# Patient Record
Sex: MALE | Race: WHITE | NOT HISPANIC OR LATINO | Employment: PART TIME | URBAN - METROPOLITAN AREA
[De-identification: names, ages, dates, MRNs, and addresses within clinical notes are randomized per-mention and may not be internally consistent; named-entity substitution may affect disease eponyms.]

---

## 2017-12-06 ENCOUNTER — HOSPITAL ENCOUNTER (OUTPATIENT)
Dept: RADIOLOGY | Facility: HOSPITAL | Age: 22
Discharge: HOME | End: 2017-12-06
Attending: ORTHOPAEDIC SURGERY
Payer: COMMERCIAL

## 2017-12-06 ENCOUNTER — OFFICE VISIT (OUTPATIENT)
Dept: SPORTS MEDICINE | Facility: CLINIC | Age: 22
End: 2017-12-06
Payer: COMMERCIAL

## 2017-12-06 VITALS
WEIGHT: 171 LBS | HEART RATE: 75 BPM | HEIGHT: 70 IN | DIASTOLIC BLOOD PRESSURE: 72 MMHG | SYSTOLIC BLOOD PRESSURE: 118 MMHG | BODY MASS INDEX: 24.48 KG/M2

## 2017-12-06 DIAGNOSIS — M25.562 CHRONIC PAIN OF LEFT KNEE: ICD-10-CM

## 2017-12-06 DIAGNOSIS — G89.29 CHRONIC PAIN OF RIGHT KNEE: ICD-10-CM

## 2017-12-06 DIAGNOSIS — M25.569 KNEE PAIN, UNSPECIFIED CHRONICITY, UNSPECIFIED LATERALITY: ICD-10-CM

## 2017-12-06 DIAGNOSIS — G89.29 CHRONIC PAIN OF LEFT KNEE: ICD-10-CM

## 2017-12-06 DIAGNOSIS — M25.569 KNEE PAIN, UNSPECIFIED CHRONICITY, UNSPECIFIED LATERALITY: Primary | ICD-10-CM

## 2017-12-06 DIAGNOSIS — M25.561 CHRONIC PAIN OF RIGHT KNEE: ICD-10-CM

## 2017-12-06 PROCEDURE — 99999 PR PBB SHADOW E&M-EST. PATIENT-LVL III: CPT | Mod: PBBFAC,,, | Performed by: ORTHOPAEDIC SURGERY

## 2017-12-06 PROCEDURE — 73564 X-RAY EXAM KNEE 4 OR MORE: CPT | Mod: TC,50,PO

## 2017-12-06 PROCEDURE — 73564 X-RAY EXAM KNEE 4 OR MORE: CPT | Mod: 26,50,, | Performed by: RADIOLOGY

## 2017-12-06 PROCEDURE — 99214 OFFICE O/P EST MOD 30 MIN: CPT | Mod: S$GLB,,, | Performed by: ORTHOPAEDIC SURGERY

## 2017-12-06 NOTE — PROGRESS NOTES
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 Male with a history of Bilateral pain who He states that the pain is severe and not responding to any conservative care.      Pain has been present for 3-4 years  Right knee is worse than the left  No recent injury but he notes his pain began doing PT testing in the past that involved running on an uneven surface    + mechanical symptoms (clicking), no instability  He notes that his knee has locked up in the past and given out on him in the past     Is affecting ADLs.      Patient would like to have Dr. Panchal sign paperwork to get a profile to allow him to walk instead of run due to his pain for PT testing for [employer]     Review of Systems   Constitution: Negative. Negative for chills, fever and night sweats.   HENT: Negative for congestion and headaches.    Eyes: Negative for blurred vision, left vision loss and right vision loss.   Cardiovascular: Negative for chest pain and syncope.   Respiratory: Negative for cough and shortness of breath.    Endocrine: Negative for polydipsia, polyphagia and polyuria.   Hematologic/Lymphatic: Negative for bleeding problem. Does not bruise/bleed easily.   Skin: Negative for dry skin, itching and rash.   Musculoskeletal: Negative for falls. Positive for knee pain and muscle weakness.   Gastrointestinal: Negative for abdominal pain and bowel incontinence.   Genitourinary: Negative for bladder incontinence and nocturia.   Neurological: Negative for disturbances in coordination, loss of balance and seizures.   Psychiatric/Behavioral: Negative for depression. The patient does not have insomnia.    Allergic/Immunologic: Negative for hives and persistent infections.     PAST MEDICAL HISTORY:   Past Medical History:   Diagnosis Date    Anxiety     Depression     History of psychiatric care     Psychiatric problem     Therapy      PAST SURGICAL HISTORY:   Past  "Surgical History:   Procedure Laterality Date    APPENDECTOMY       FAMILY HISTORY:   Family History   Problem Relation Age of Onset    No Known Problems Mother     No Known Problems Father     No Known Problems Sister     No Known Problems Brother      SOCIAL HISTORY:   Social History     Social History    Marital status: Single     Spouse name: N/A    Number of children: N/A    Years of education: N/A     Occupational History    Not on file.     Social History Main Topics    Smoking status: Never Smoker    Smokeless tobacco: Never Used    Alcohol use Yes      Comment: social     Drug use: No    Sexual activity: No     Other Topics Concern    Caffeine Use Yes    Legal: Involved In Criminal Litigation No    Financial Status: Employed Yes    Leisure: Movie Watching Yes    Childhood History: Raised By Parents Yes    Childhood History: Other Yes    Leisure: Time With Family Yes    Education: High School Graduate Yes    Home Situation: Lives With Family Yes    Patient Has Had A Drink/Used Drugs As An Eye Opener In The Am No     Social History Narrative    No narrative on file       MEDICATIONS:   Current Outpatient Prescriptions:     ondansetron (ZOFRAN) 4 MG tablet, Take 1 tablet (4 mg total) by mouth every 6 (six) hours as needed for Nausea., Disp: 12 tablet, Rfl: 0  ALLERGIES: Review of patient's allergies indicates:  No Known Allergies    VITAL SIGNS: /72   Pulse 75   Ht 5' 10" (1.778 m)   Wt 77.6 kg (171 lb)   BMI 24.54 kg/m²      PHYSICAL EXAMINATION  VITAL SIGNS: /72   Pulse 75   Ht 5' 10" (1.778 m)   Wt 77.6 kg (171 lb)   BMI 24.54 kg/m²    General:  The patient is alert and oriented x 3.  Mood is pleasant.  Observation of ears, eyes and nose reveal no gross abnormalities.  HEENT: NCAT, sclera nonicteric  Lungs: Respirations are equal and unlabored.    Bilateral KNEE EXAMINATION     OBSERVATION / INSPECTION   Gait:   Nonantalgic   Alignment:  Neutral   Scars:   None "   Muscle atrophy: Mild  Effusion:  None   Warmth:  None   Discoloration:   none     TENDERNESS / CREPITUS (T / C):          T / C      T / C   Patella   - / -   Lateral joint line   + right / -    Peripatellar medial  -  Medial joint line    + left / -    Peripatellar lateral -  Medial plica   - / -    Patellar tendon + bilat   Popliteal fossa   - / -    Quad tendon   + bilat   Gastrocnemius   -   Prepatellar Bursa - / -   Quadricep   -   Tibial tubercle  -  Thigh/hamstring  -   Pes anserine/HS + bilat  Fibula    -   ITB   + bilat/ -  Tibia     -   Tib/fib joint  - / -  LCL    -     MFC   - / -   MCL: Proximal  -    LFC   - / -    Distal   -          ROM: (* = pain)  PASSIVE   ACTIVE    Left :   5 / 0 / 135   5 / 0 / 135     Right :    5 / 0 / 135   5 / 0 / 135    Patellofemoral examination:  See above noted areas of tenderness.   Patella position    Subluxation / dislocation: Centered           Sup. / Inf;   Normal   Crepitus (PF):    Absent   Patellar Mobility:       Medial-lateral:   Normal    Superior-inferior:  Normal    Inferior tilt   Normal    Patellar tendon:  Normal   Lateral tilt:    Normal   J-sign:     None   Patellofemoral grind:   No pain       MENISCAL SIGNS:     Pain on terminal extension:  -  Pain on terminal flexion:  + right  Robins maneuver:  + for pain, bilateral  Squat     + anterior knee pain    LIGAMENT EXAMINATION:  ACL / Lachman:  normal (-1 to 2mm)    PCL-Post.  drawer: normal 0 to 2mm  MCL- Valgus:  normal 0 to 2mm  LCL- Varus:  normal 0 to 2mm  Pivot shift: normal (Equal)   Dial Test: difference c/w other side   At 30° flexion: normal (< 5°)    At 90° flexion: normal (< 5°)   Reverse Pivot Shift:   normal (Equal)     STRENGTH: (* = with pain) PAINFUL SIDE   Quadricep   5/5   Hamstrin/5    EXTREMITY NEURO-VASCULAR EXAMINATION:   Sensation:  Grossly intact to light touch all dermatomal regions.   Motor Function:  Fully intact motor function at hip, knee, foot and ankle     DTRs;  quadriceps and  achilles 2+.  No clonus and downgoing Babinski.    Vascular status:  DP and PT pulses 2+, brisk capillary refill, symmetric.     Other Findings:  ++ Bridge  + step down     X-rays:  including standing, weight bearing AP and flexion bilateral knees, lateral and merchant views ordered and images reviewed by me show:  No fracture, dislocation     ASSESSMENT:    Bilateral Knee anterior knee pain due to overload because of hip/core weakness  It band tightness    PLAN:   PT  All questions were answered, pt will contact us for questions or concerns in the interim.

## 2018-02-07 ENCOUNTER — TELEPHONE (OUTPATIENT)
Dept: SPORTS MEDICINE | Facility: CLINIC | Age: 23
End: 2018-02-07

## 2018-02-07 DIAGNOSIS — M25.561 PAIN IN BOTH KNEES, UNSPECIFIED CHRONICITY: Primary | ICD-10-CM

## 2018-02-07 DIAGNOSIS — M25.562 PAIN IN BOTH KNEES, UNSPECIFIED CHRONICITY: Primary | ICD-10-CM

## 2019-11-20 ENCOUNTER — HOSPITAL ENCOUNTER (INPATIENT)
Facility: HOSPITAL | Age: 24
LOS: 3 days | Discharge: HOME HEALTH | DRG: 517 | End: 2019-11-23
Attending: EMERGENCY MEDICINE | Admitting: ORTHOPAEDIC SURGERY
Payer: COMMERCIAL

## 2019-11-20 ENCOUNTER — ANESTHESIA EVENT (OUTPATIENT)
Dept: SURGERY | Facility: HOSPITAL | Age: 24
DRG: 517 | End: 2019-11-20
Payer: COMMERCIAL

## 2019-11-20 DIAGNOSIS — M25.551 RIGHT HIP PAIN: ICD-10-CM
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
## 2019-11-20 DIAGNOSIS — S32.421A: ICD-10-CM

## 2019-11-20 DIAGNOSIS — S32.421A CLOSED DISPLACED FRACTURE OF POSTERIOR WALL OF RIGHT ACETABULUM, INITIAL ENCOUNTER: ICD-10-CM

## 2019-11-20 DIAGNOSIS — S73.014A CLOSED POSTERIOR DISLOCATION OF RIGHT HIP, INITIAL ENCOUNTER: Primary | ICD-10-CM

## 2019-11-20 DIAGNOSIS — S32.461A: ICD-10-CM

## 2019-11-20 LAB
ABO + RH BLD: NORMAL
ALBUMIN SERPL BCP-MCNC: 4.4 G/DL (ref 3.5–5.2)
ALP SERPL-CCNC: 56 U/L (ref 55–135)
ALT SERPL W/O P-5'-P-CCNC: 70 U/L (ref 10–44)
ANION GAP SERPL CALC-SCNC: 15 MMOL/L (ref 8–16)
AST SERPL-CCNC: 88 U/L (ref 10–40)
BASOPHILS # BLD AUTO: 0.08 K/UL (ref 0–0.2)
BASOPHILS NFR BLD: 1.4 % (ref 0–1.9)
BILIRUB SERPL-MCNC: 0.4 MG/DL (ref 0.1–1)
BLD GP AB SCN CELLS X3 SERPL QL: NORMAL
BUN SERPL-MCNC: 16 MG/DL (ref 6–20)
CALCIUM SERPL-MCNC: 9.1 MG/DL (ref 8.7–10.5)
CHLORIDE SERPL-SCNC: 104 MMOL/L (ref 95–110)
CO2 SERPL-SCNC: 24 MMOL/L (ref 23–29)
CREAT SERPL-MCNC: 1.1 MG/DL (ref 0.5–1.4)
DIFFERENTIAL METHOD: ABNORMAL
EOSINOPHIL # BLD AUTO: 0.1 K/UL (ref 0–0.5)
EOSINOPHIL NFR BLD: 2.1 % (ref 0–8)
ERYTHROCYTE [DISTWIDTH] IN BLOOD BY AUTOMATED COUNT: 12.4 % (ref 11.5–14.5)
EST. GFR  (AFRICAN AMERICAN): >60 ML/MIN/1.73 M^2
EST. GFR  (NON AFRICAN AMERICAN): >60 ML/MIN/1.73 M^2
ETHANOL SERPL-MCNC: 108 MG/DL
GLUCOSE SERPL-MCNC: 98 MG/DL (ref 70–110)
HCT VFR BLD AUTO: 45.3 % (ref 40–54)
HGB BLD-MCNC: 14.6 G/DL (ref 14–18)
IMM GRANULOCYTES # BLD AUTO: 0.04 K/UL (ref 0–0.04)
IMM GRANULOCYTES NFR BLD AUTO: 0.7 % (ref 0–0.5)
INR PPP: 1 (ref 0.8–1.2)
LYMPHOCYTES # BLD AUTO: 1.9 K/UL (ref 1–4.8)
LYMPHOCYTES NFR BLD: 32 % (ref 18–48)
MCH RBC QN AUTO: 28.3 PG (ref 27–31)
MCHC RBC AUTO-ENTMCNC: 32.2 G/DL (ref 32–36)
MCV RBC AUTO: 88 FL (ref 82–98)
MONOCYTES # BLD AUTO: 0.4 K/UL (ref 0.3–1)
MONOCYTES NFR BLD: 6.5 % (ref 4–15)
NEUTROPHILS # BLD AUTO: 3.3 K/UL (ref 1.8–7.7)
NEUTROPHILS NFR BLD: 57.3 % (ref 38–73)
NRBC BLD-RTO: 0 /100 WBC
PLATELET # BLD AUTO: 269 K/UL (ref 150–350)
PMV BLD AUTO: 11.1 FL (ref 9.2–12.9)
POTASSIUM SERPL-SCNC: 3.9 MMOL/L (ref 3.5–5.1)
PROT SERPL-MCNC: 8 G/DL (ref 6–8.4)
PROTHROMBIN TIME: 10.7 SEC (ref 9–12.5)
RBC # BLD AUTO: 5.16 M/UL (ref 4.6–6.2)
SODIUM SERPL-SCNC: 143 MMOL/L (ref 136–145)
WBC # BLD AUTO: 5.82 K/UL (ref 3.9–12.7)

## 2019-11-20 PROCEDURE — 11000001 HC ACUTE MED/SURG PRIVATE ROOM

## 2019-11-20 PROCEDURE — 86920 COMPATIBILITY TEST SPIN: CPT

## 2019-11-20 PROCEDURE — 25500020 PHARM REV CODE 255: Performed by: EMERGENCY MEDICINE

## 2019-11-20 PROCEDURE — 99285 PR EMERGENCY DEPT VISIT,LEVEL V: ICD-10-PCS | Mod: 25,,, | Performed by: PHYSICIAN ASSISTANT

## 2019-11-20 PROCEDURE — 63600175 PHARM REV CODE 636 W HCPCS: Performed by: STUDENT IN AN ORGANIZED HEALTH CARE EDUCATION/TRAINING PROGRAM

## 2019-11-20 PROCEDURE — 86901 BLOOD TYPING SEROLOGIC RH(D): CPT

## 2019-11-20 PROCEDURE — 63600175 PHARM REV CODE 636 W HCPCS

## 2019-11-20 PROCEDURE — 96375 TX/PRO/DX INJ NEW DRUG ADDON: CPT

## 2019-11-20 PROCEDURE — 80320 DRUG SCREEN QUANTALCOHOLS: CPT

## 2019-11-20 PROCEDURE — 80053 COMPREHEN METABOLIC PANEL: CPT

## 2019-11-20 PROCEDURE — 25000003 PHARM REV CODE 250: Performed by: STUDENT IN AN ORGANIZED HEALTH CARE EDUCATION/TRAINING PROGRAM

## 2019-11-20 PROCEDURE — 63600175 PHARM REV CODE 636 W HCPCS: Performed by: PHYSICIAN ASSISTANT

## 2019-11-20 PROCEDURE — 96374 THER/PROPH/DIAG INJ IV PUSH: CPT

## 2019-11-20 PROCEDURE — 99152 PR MOD CONSCIOUS SEDATION, SAME PHYS, 5+ YRS, FIRST 15 MIN: ICD-10-PCS | Mod: ,,, | Performed by: PHYSICIAN ASSISTANT

## 2019-11-20 PROCEDURE — 99152 MOD SED SAME PHYS/QHP 5/>YRS: CPT | Mod: ,,, | Performed by: PHYSICIAN ASSISTANT

## 2019-11-20 PROCEDURE — 99285 EMERGENCY DEPT VISIT HI MDM: CPT | Mod: 25

## 2019-11-20 PROCEDURE — 99285 EMERGENCY DEPT VISIT HI MDM: CPT | Mod: 25,,, | Performed by: PHYSICIAN ASSISTANT

## 2019-11-20 PROCEDURE — 85610 PROTHROMBIN TIME: CPT

## 2019-11-20 PROCEDURE — 63600175 PHARM REV CODE 636 W HCPCS: Performed by: EMERGENCY MEDICINE

## 2019-11-20 PROCEDURE — 51702 INSERT TEMP BLADDER CATH: CPT

## 2019-11-20 PROCEDURE — 85025 COMPLETE CBC W/AUTO DIFF WBC: CPT

## 2019-11-20 RX ORDER — PHENYLEPHRINE HCL IN 0.9% NACL 1 MG/10 ML
SYRINGE (ML) INTRAVENOUS
Status: DISCONTINUED
Start: 2019-11-20 | End: 2019-11-20 | Stop reason: WASHOUT

## 2019-11-20 RX ORDER — LIDOCAINE HYDROCHLORIDE 10 MG/ML
INJECTION INFILTRATION; PERINEURAL
Status: DISPENSED
Start: 2019-11-20 | End: 2019-11-20

## 2019-11-20 RX ORDER — FENTANYL CITRATE 50 UG/ML
75 INJECTION, SOLUTION INTRAMUSCULAR; INTRAVENOUS
Status: DISCONTINUED | OUTPATIENT
Start: 2019-11-20 | End: 2019-11-20

## 2019-11-20 RX ORDER — FENTANYL CITRATE 50 UG/ML
100 INJECTION, SOLUTION INTRAMUSCULAR; INTRAVENOUS
Status: COMPLETED | OUTPATIENT
Start: 2019-11-20 | End: 2019-11-20

## 2019-11-20 RX ORDER — MORPHINE SULFATE 2 MG/ML
6 INJECTION, SOLUTION INTRAMUSCULAR; INTRAVENOUS
Status: COMPLETED | OUTPATIENT
Start: 2019-11-20 | End: 2019-11-20

## 2019-11-20 RX ORDER — LIDOCAINE HYDROCHLORIDE 10 MG/ML
20 INJECTION INFILTRATION; PERINEURAL ONCE
Status: COMPLETED | OUTPATIENT
Start: 2019-11-20 | End: 2019-11-20

## 2019-11-20 RX ORDER — ONDANSETRON 8 MG/1
8 TABLET, ORALLY DISINTEGRATING ORAL EVERY 8 HOURS PRN
Status: DISCONTINUED | OUTPATIENT
Start: 2019-11-20 | End: 2019-11-23 | Stop reason: HOSPADM

## 2019-11-20 RX ORDER — PROPOFOL 10 MG/ML
INJECTION, EMULSION INTRAVENOUS
Status: DISCONTINUED
Start: 2019-11-20 | End: 2019-11-20 | Stop reason: WASHOUT

## 2019-11-20 RX ORDER — KETOROLAC TROMETHAMINE 30 MG/ML
10 INJECTION, SOLUTION INTRAMUSCULAR; INTRAVENOUS
Status: COMPLETED | OUTPATIENT
Start: 2019-11-20 | End: 2019-11-20

## 2019-11-20 RX ORDER — LIDOCAINE HYDROCHLORIDE 10 MG/ML
1 INJECTION, SOLUTION EPIDURAL; INFILTRATION; INTRACAUDAL; PERINEURAL ONCE
Status: DISCONTINUED | OUTPATIENT
Start: 2019-11-20 | End: 2019-11-23 | Stop reason: HOSPADM

## 2019-11-20 RX ORDER — METHOCARBAMOL 750 MG/1
750 TABLET, FILM COATED ORAL 3 TIMES DAILY
Status: DISCONTINUED | OUTPATIENT
Start: 2019-11-20 | End: 2019-11-23 | Stop reason: HOSPADM

## 2019-11-20 RX ORDER — SODIUM CHLORIDE 0.9 % (FLUSH) 0.9 %
10 SYRINGE (ML) INJECTION
Status: DISCONTINUED | OUTPATIENT
Start: 2019-11-20 | End: 2019-11-23 | Stop reason: HOSPADM

## 2019-11-20 RX ORDER — ACETAMINOPHEN 325 MG/1
650 TABLET ORAL
Status: DISCONTINUED | OUTPATIENT
Start: 2019-11-20 | End: 2019-11-23 | Stop reason: HOSPADM

## 2019-11-20 RX ORDER — OXYCODONE HYDROCHLORIDE 5 MG/1
5 TABLET ORAL EVERY 4 HOURS PRN
Status: DISCONTINUED | OUTPATIENT
Start: 2019-11-20 | End: 2019-11-23 | Stop reason: HOSPADM

## 2019-11-20 RX ORDER — TALC
6 POWDER (GRAM) TOPICAL NIGHTLY PRN
Status: DISCONTINUED | OUTPATIENT
Start: 2019-11-20 | End: 2019-11-23 | Stop reason: HOSPADM

## 2019-11-20 RX ORDER — SUCCINYLCHOLINE CHLORIDE 20 MG/ML
INJECTION INTRAMUSCULAR; INTRAVENOUS
Status: DISCONTINUED
Start: 2019-11-20 | End: 2019-11-20 | Stop reason: WASHOUT

## 2019-11-20 RX ORDER — ROCURONIUM BROMIDE 10 MG/ML
INJECTION, SOLUTION INTRAVENOUS
Status: DISCONTINUED
Start: 2019-11-20 | End: 2019-11-20 | Stop reason: WASHOUT

## 2019-11-20 RX ORDER — PROPOFOL 10 MG/ML
1 VIAL (ML) INTRAVENOUS
Status: COMPLETED | OUTPATIENT
Start: 2019-11-20 | End: 2019-11-20

## 2019-11-20 RX ORDER — OXYCODONE HYDROCHLORIDE 10 MG/1
10 TABLET ORAL EVERY 4 HOURS PRN
Status: DISCONTINUED | OUTPATIENT
Start: 2019-11-20 | End: 2019-11-23 | Stop reason: HOSPADM

## 2019-11-20 RX ORDER — PROPOFOL 10 MG/ML
INJECTION, EMULSION INTRAVENOUS
Status: COMPLETED
Start: 2019-11-20 | End: 2019-11-20

## 2019-11-20 RX ORDER — ETOMIDATE 2 MG/ML
INJECTION INTRAVENOUS
Status: DISCONTINUED
Start: 2019-11-20 | End: 2019-11-20 | Stop reason: WASHOUT

## 2019-11-20 RX ORDER — FENTANYL CITRATE 50 UG/ML
INJECTION, SOLUTION INTRAMUSCULAR; INTRAVENOUS
Status: DISPENSED
Start: 2019-11-20 | End: 2019-11-20

## 2019-11-20 RX ORDER — HYDROMORPHONE HYDROCHLORIDE 1 MG/ML
0.5 INJECTION, SOLUTION INTRAMUSCULAR; INTRAVENOUS; SUBCUTANEOUS
Status: DISCONTINUED | OUTPATIENT
Start: 2019-11-20 | End: 2019-11-23 | Stop reason: HOSPADM

## 2019-11-20 RX ADMIN — IOHEXOL 75 ML: 350 INJECTION, SOLUTION INTRAVENOUS at 07:11

## 2019-11-20 RX ADMIN — ACETAMINOPHEN 650 MG: 325 TABLET ORAL at 11:11

## 2019-11-20 RX ADMIN — MORPHINE SULFATE 6 MG: 2 INJECTION, SOLUTION INTRAMUSCULAR; INTRAVENOUS at 03:11

## 2019-11-20 RX ADMIN — PROPOFOL 197 MG: 10 INJECTION, EMULSION INTRAVENOUS at 06:11

## 2019-11-20 RX ADMIN — Medication 197 MG: at 06:11

## 2019-11-20 RX ADMIN — METHOCARBAMOL TABLETS 750 MG: 750 TABLET, COATED ORAL at 03:11

## 2019-11-20 RX ADMIN — LIDOCAINE HYDROCHLORIDE 20 ML: 10 INJECTION, SOLUTION INFILTRATION; PERINEURAL at 05:11

## 2019-11-20 RX ADMIN — KETOROLAC TROMETHAMINE 10 MG: 30 INJECTION, SOLUTION INTRAMUSCULAR; INTRAVENOUS at 03:11

## 2019-11-20 RX ADMIN — FENTANYL CITRATE 100 MCG: 50 INJECTION INTRAMUSCULAR; INTRAVENOUS at 06:11

## 2019-11-20 RX ADMIN — ACETAMINOPHEN 650 MG: 325 TABLET ORAL at 08:11

## 2019-11-20 RX ADMIN — HYDROMORPHONE HYDROCHLORIDE 0.5 MG: 1 INJECTION, SOLUTION INTRAMUSCULAR; INTRAVENOUS; SUBCUTANEOUS at 11:11

## 2019-11-20 RX ADMIN — METHOCARBAMOL TABLETS 750 MG: 750 TABLET, COATED ORAL at 08:11
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
## 2019-11-20 NOTE — NURSING
Report received from ED nurse. Informed pt refused arriaga. Order still in place for mandatory arriaga for surgery. Informed to get order DC'd or to place arriaga before coming to floor.

## 2019-11-20 NOTE — ED NOTES
Personal belongings given to father.   Ladarius (father): [phone], please call for room assignment and update

## 2019-11-20 NOTE — SUBJECTIVE & OBJECTIVE
Past Medical History:   Diagnosis Date    Anxiety     Depression     History of psychiatric care     Psychiatric problem     Therapy        Past Surgical History:   Procedure Laterality Date    APPENDECTOMY         Review of patient's allergies indicates:  No Known Allergies    Current Facility-Administered Medications   Medication    acetaminophen tablet 650 mg    HYDROmorphone injection 0.5 mg    lidocaine (PF) 10 mg/ml (1%) injection 10 mg    melatonin tablet 6 mg    methocarbamol tablet 750 mg    ondansetron disintegrating tablet 8 mg    oxyCODONE immediate release tablet 10 mg    oxyCODONE immediate release tablet 15 mg    oxyCODONE immediate release tablet 5 mg    sodium chloride 0.9% flush 10 mL     Current Outpatient Medications   Medication Sig    acyclovir (ZOVIRAX) 400 MG tablet Take 1 tablet by mouth three times daily for 7 days.    ondansetron (ZOFRAN) 4 MG tablet Take 1 tablet (4 mg total) by mouth every 6 (six) hours as needed for Nausea.    sulfamethoxazole-trimethoprim 800-160mg (BACTRIM DS) 800-160 mg Tab Take 1 tabley by mouth twice daily for 10 days.     Family History     Problem Relation (Age of Onset)    No Known Problems Mother, Father, Sister, Brother        Tobacco Use    Smoking status: Never Smoker    Smokeless tobacco: Never Used   Substance and Sexual Activity    Alcohol use: Yes     Comment: social     Drug use: No    Sexual activity: Never     ROS   Constitutional: negative for fevers  Eyes: negative visual changes  ENT: negative for hearing loss  Respiratory: negative for dyspnea  Cardiovascular: negative for chest pain  Gastrointestinal: negative for abdominal pain  Genitourinary: negative for dysuria  Neurological: negative for headaches  Behavioral/Psych: negative for hallucinations  Endocrine: negative for temperature intolerance    Objective:     Vital Signs (Most Recent):  Temp: 98.6 °F (37 °C) (11/20/19 0630)  Pulse: 94 (11/20/19 1001)  Resp: 20  (11/20/19 1001)  BP: 127/65 (11/20/19 0742)  SpO2: 100 % (11/20/19 1001) Vital Signs (24h Range):  Temp:  [98.1 °F (36.7 °C)-98.6 °F (37 °C)] 98.6 °F (37 °C)  Pulse:  [] 94  Resp:  [12-29] 20  SpO2:  [95 %-100 %] 100 %  BP: (108-139)/(56-80) 127/65     Weight: 77.6 kg (171 lb)     Body mass index is 24.54 kg/m².    No intake or output data in the 24 hours ending 11/20/19 1034    Ortho/SPM Exam   No TTP of bony spine  No signs of trauma to the abdomen or chest    RLE:   Skin intact  Shortened on exam when compared to contralateral leg  No ecchymosis, erythema  Pain with any ROM of hip  No pain with ROM of knee or ankle  No TTP of femur, tibia, fibula, or foot  Pain with palpation of lateral hip  SILT throughout  Motor intact throughout  DP/PT pulses 2+    LLE:  Skin intact  No ecchymosis, erythema  Painless ROM of hip, knee, ankle  No TTP of femur, tibia, fibula, foot  SILT throughout  Motor intact throughout  DP/PT pulses 2+    RUE:  Skin intact  No ecchymosis or erythema  Painless ROM of the shoulder, elbow, and wrist  No TTP of clavicle, humerus, radius, ulna, or hand  SILT throughout  Motor intact througout  R/U pulses 2+    LUE:  Skin intact  No ecchymosis or erythema  Painless ROM of the shoulder, elbow, and wrist  No TTP of clavicle, humerus, radius, ulna, or hand  SILT throughout  Motor intact througout  R/U pulses 2+        Significant Labs:   CBC:   Recent Labs   Lab 11/20/19  0346   WBC 5.82   HGB 14.6   HCT 45.3        CMP:   Recent Labs   Lab 11/20/19  0346      K 3.9      CO2 24   GLU 98   BUN 16   CREATININE 1.1   CALCIUM 9.1   PROT 8.0   ALBUMIN 4.4   BILITOT 0.4   ALKPHOS 56   AST 88*   ALT 70*   ANIONGAP 15   EGFRNONAA >60.0       Significant Imaging: I have reviewed all pertinent imaging results/findings.   X-ray and CT of pelvis showing a right posterior wall ministerio transverse acetabular fracture/dislocation
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
## 2019-11-20 NOTE — ED NOTES
Assumed patient care.     Pt resting on stretcher in NAD, respirations even and unlabored. Stretcher locked and in lowest position, side rails x 2, call bell within reach. Cardiac monitor, pulse ox and BP cuff applied cycling Q 30 minute vitals. Pt offered restroom assistance, pt states no needs at this time. Will continue to monitor and update pt on plan of care.    LOC: The patient is awake, alert and aware of environment with an appropriate affect, the patient is oriented x 3 and speaking appropriately.  APPEARANCE: Patient resting comfortably and in no acute distress, patient remains in hospital gown   SKIN: The skin is warm and dry, color consistent with ethnicity.  MUSCULOSKELETAL: Patient moving all extremities well except lower right extremity, traction applied to right lower extremity.   RESPIRATORY: Airway is open and patent; respirations are spontaneous, patient has a normal effort and rate, no accessory muscle use noted.   NEUROLOGIC: PERRL, eyes open spontaneously, behavior appropriate to situation, follows commands, facial expression symmetrical, bilateral hand grasp equal and even, purposeful motor response noted
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
## 2019-11-21 ENCOUNTER — ANESTHESIA (OUTPATIENT)
Dept: SURGERY | Facility: HOSPITAL | Age: 24
DRG: 517 | End: 2019-11-21
Payer: COMMERCIAL

## 2019-11-21 PROBLEM — S32.461A: Status: ACTIVE | Noted: 2019-11-20

## 2019-11-21 LAB
ALBUMIN SERPL BCP-MCNC: 3.7 G/DL (ref 3.5–5.2)
ALP SERPL-CCNC: 50 U/L (ref 55–135)
ALT SERPL W/O P-5'-P-CCNC: 41 U/L (ref 10–44)
ANION GAP SERPL CALC-SCNC: 14 MMOL/L (ref 8–16)
ANION GAP SERPL CALC-SCNC: 9 MMOL/L (ref 8–16)
AST SERPL-CCNC: 34 U/L (ref 10–40)
BASOPHILS # BLD AUTO: 0.06 K/UL (ref 0–0.2)
BASOPHILS NFR BLD: 0.3 % (ref 0–1.9)
BASOPHILS NFR BLD: 0.3 % (ref 0–1.9)
BASOPHILS NFR BLD: 0.7 % (ref 0–1.9)
BILIRUB SERPL-MCNC: 0.8 MG/DL (ref 0.1–1)
BUN SERPL-MCNC: 13 MG/DL (ref 6–20)
BUN SERPL-MCNC: 13 MG/DL (ref 6–20)
CALCIUM SERPL-MCNC: 8.5 MG/DL (ref 8.7–10.5)
CALCIUM SERPL-MCNC: 8.8 MG/DL (ref 8.7–10.5)
CHLORIDE SERPL-SCNC: 105 MMOL/L (ref 95–110)
CHLORIDE SERPL-SCNC: 105 MMOL/L (ref 95–110)
CO2 SERPL-SCNC: 20 MMOL/L (ref 23–29)
CO2 SERPL-SCNC: 26 MMOL/L (ref 23–29)
CREAT SERPL-MCNC: 0.8 MG/DL (ref 0.5–1.4)
CREAT SERPL-MCNC: 1.1 MG/DL (ref 0.5–1.4)
DIFFERENTIAL METHOD: ABNORMAL
EOSINOPHIL # BLD AUTO: 0 K/UL (ref 0–0.5)
EOSINOPHIL # BLD AUTO: 0 K/UL (ref 0–0.5)
EOSINOPHIL # BLD AUTO: 0.2 K/UL (ref 0–0.5)
EOSINOPHIL NFR BLD: 0.1 % (ref 0–8)
EOSINOPHIL NFR BLD: 0.1 % (ref 0–8)
EOSINOPHIL NFR BLD: 2 % (ref 0–8)
ERYTHROCYTE [DISTWIDTH] IN BLOOD BY AUTOMATED COUNT: 12.7 % (ref 11.5–14.5)
ERYTHROCYTE [DISTWIDTH] IN BLOOD BY AUTOMATED COUNT: 12.7 % (ref 11.5–14.5)
ERYTHROCYTE [DISTWIDTH] IN BLOOD BY AUTOMATED COUNT: 12.8 % (ref 11.5–14.5)
EST. GFR  (AFRICAN AMERICAN): >60 ML/MIN/1.73 M^2
EST. GFR  (AFRICAN AMERICAN): >60 ML/MIN/1.73 M^2
EST. GFR  (NON AFRICAN AMERICAN): >60 ML/MIN/1.73 M^2
EST. GFR  (NON AFRICAN AMERICAN): >60 ML/MIN/1.73 M^2
GLUCOSE SERPL-MCNC: 204 MG/DL (ref 70–110)
GLUCOSE SERPL-MCNC: 80 MG/DL (ref 70–110)
HCT VFR BLD AUTO: 42.8 % (ref 40–54)
HCT VFR BLD AUTO: 42.8 % (ref 40–54)
HCT VFR BLD AUTO: 43.8 % (ref 40–54)
HGB BLD-MCNC: 13.7 G/DL (ref 14–18)
HGB BLD-MCNC: 13.7 G/DL (ref 14–18)
HGB BLD-MCNC: 13.9 G/DL (ref 14–18)
IMM GRANULOCYTES # BLD AUTO: 0.03 K/UL (ref 0–0.04)
IMM GRANULOCYTES # BLD AUTO: 0.07 K/UL (ref 0–0.04)
IMM GRANULOCYTES # BLD AUTO: 0.07 K/UL (ref 0–0.04)
IMM GRANULOCYTES NFR BLD AUTO: 0.3 % (ref 0–0.5)
IMM GRANULOCYTES NFR BLD AUTO: 0.4 % (ref 0–0.5)
IMM GRANULOCYTES NFR BLD AUTO: 0.4 % (ref 0–0.5)
LYMPHOCYTES # BLD AUTO: 1.3 K/UL (ref 1–4.8)
LYMPHOCYTES # BLD AUTO: 1.3 K/UL (ref 1–4.8)
LYMPHOCYTES # BLD AUTO: 1.6 K/UL (ref 1–4.8)
LYMPHOCYTES NFR BLD: 18.3 % (ref 18–48)
LYMPHOCYTES NFR BLD: 7.4 % (ref 18–48)
LYMPHOCYTES NFR BLD: 7.4 % (ref 18–48)
MCH RBC QN AUTO: 28 PG (ref 27–31)
MCHC RBC AUTO-ENTMCNC: 31.7 G/DL (ref 32–36)
MCHC RBC AUTO-ENTMCNC: 32 G/DL (ref 32–36)
MCHC RBC AUTO-ENTMCNC: 32 G/DL (ref 32–36)
MCV RBC AUTO: 88 FL (ref 82–98)
MONOCYTES # BLD AUTO: 0.6 K/UL (ref 0.3–1)
MONOCYTES # BLD AUTO: 0.6 K/UL (ref 0.3–1)
MONOCYTES # BLD AUTO: 0.9 K/UL (ref 0.3–1)
MONOCYTES NFR BLD: 3.2 % (ref 4–15)
MONOCYTES NFR BLD: 3.2 % (ref 4–15)
MONOCYTES NFR BLD: 9.6 % (ref 4–15)
NEUTROPHILS # BLD AUTO: 15.7 K/UL (ref 1.8–7.7)
NEUTROPHILS # BLD AUTO: 15.7 K/UL (ref 1.8–7.7)
NEUTROPHILS # BLD AUTO: 6.1 K/UL (ref 1.8–7.7)
NEUTROPHILS NFR BLD: 69.1 % (ref 38–73)
NEUTROPHILS NFR BLD: 88.6 % (ref 38–73)
NEUTROPHILS NFR BLD: 88.6 % (ref 38–73)
NRBC BLD-RTO: 0 /100 WBC
PLATELET # BLD AUTO: 203 K/UL (ref 150–350)
PLATELET # BLD AUTO: 292 K/UL (ref 150–350)
PLATELET # BLD AUTO: 292 K/UL (ref 150–350)
PMV BLD AUTO: 11.2 FL (ref 9.2–12.9)
PMV BLD AUTO: 11.2 FL (ref 9.2–12.9)
PMV BLD AUTO: 11.5 FL (ref 9.2–12.9)
POTASSIUM SERPL-SCNC: 4 MMOL/L (ref 3.5–5.1)
POTASSIUM SERPL-SCNC: 4 MMOL/L (ref 3.5–5.1)
PROT SERPL-MCNC: 6.6 G/DL (ref 6–8.4)
RBC # BLD AUTO: 4.89 M/UL (ref 4.6–6.2)
RBC # BLD AUTO: 4.89 M/UL (ref 4.6–6.2)
RBC # BLD AUTO: 4.97 M/UL (ref 4.6–6.2)
SODIUM SERPL-SCNC: 139 MMOL/L (ref 136–145)
SODIUM SERPL-SCNC: 140 MMOL/L (ref 136–145)
WBC # BLD AUTO: 17.74 K/UL (ref 3.9–12.7)
WBC # BLD AUTO: 17.74 K/UL (ref 3.9–12.7)
WBC # BLD AUTO: 8.85 K/UL (ref 3.9–12.7)

## 2019-11-21 PROCEDURE — 99222 PR INITIAL HOSPITAL CARE,LEVL II: ICD-10-PCS | Mod: 57,,, | Performed by: ORTHOPAEDIC SURGERY

## 2019-11-21 PROCEDURE — 63600175 PHARM REV CODE 636 W HCPCS: Performed by: ORTHOPAEDIC SURGERY

## 2019-11-21 PROCEDURE — 80048 BASIC METABOLIC PNL TOTAL CA: CPT

## 2019-11-21 PROCEDURE — 88311 PR  DECALCIFY TISSUE: ICD-10-PCS | Mod: 26,,, | Performed by: PATHOLOGY

## 2019-11-21 PROCEDURE — 85025 COMPLETE CBC W/AUTO DIFF WBC: CPT

## 2019-11-21 PROCEDURE — 80053 COMPREHEN METABOLIC PANEL: CPT

## 2019-11-21 PROCEDURE — 88311 DECALCIFY TISSUE: CPT | Performed by: PATHOLOGY

## 2019-11-21 PROCEDURE — 27201423 OPTIME MED/SURG SUP & DEVICES STERILE SUPPLY: Performed by: ORTHOPAEDIC SURGERY

## 2019-11-21 PROCEDURE — 36000711: Performed by: ORTHOPAEDIC SURGERY

## 2019-11-21 PROCEDURE — 63600175 PHARM REV CODE 636 W HCPCS: Performed by: STUDENT IN AN ORGANIZED HEALTH CARE EDUCATION/TRAINING PROGRAM

## 2019-11-21 PROCEDURE — 27253: ICD-10-PCS | Mod: 51,RT,, | Performed by: ORTHOPAEDIC SURGERY

## 2019-11-21 PROCEDURE — D9220A PRA ANESTHESIA: ICD-10-PCS | Mod: CRNA,,, | Performed by: NURSE ANESTHETIST, CERTIFIED REGISTERED

## 2019-11-21 PROCEDURE — 37000008 HC ANESTHESIA 1ST 15 MINUTES: Performed by: ORTHOPAEDIC SURGERY

## 2019-11-21 PROCEDURE — 76942 ECHO GUIDE FOR BIOPSY: CPT | Performed by: STUDENT IN AN ORGANIZED HEALTH CARE EDUCATION/TRAINING PROGRAM

## 2019-11-21 PROCEDURE — S0020 INJECTION, BUPIVICAINE HYDRO: HCPCS | Performed by: ANESTHESIOLOGY

## 2019-11-21 PROCEDURE — 27253 TREAT HIP DISLOCATION: CPT | Mod: 51,RT,, | Performed by: ORTHOPAEDIC SURGERY

## 2019-11-21 PROCEDURE — 36415 COLL VENOUS BLD VENIPUNCTURE: CPT

## 2019-11-21 PROCEDURE — C1713 ANCHOR/SCREW BN/BN,TIS/BN: HCPCS | Performed by: ORTHOPAEDIC SURGERY

## 2019-11-21 PROCEDURE — 27200750 HC INSULATED NEEDLE/ STIMUPLEX: Performed by: STUDENT IN AN ORGANIZED HEALTH CARE EDUCATION/TRAINING PROGRAM

## 2019-11-21 PROCEDURE — D9220A PRA ANESTHESIA: ICD-10-PCS | Mod: ANES,,, | Performed by: ANESTHESIOLOGY

## 2019-11-21 PROCEDURE — 27226 TREAT HIP WALL FRACTURE: CPT | Mod: RT,,, | Performed by: ORTHOPAEDIC SURGERY

## 2019-11-21 PROCEDURE — D9220A PRA ANESTHESIA: Mod: CRNA,,, | Performed by: NURSE ANESTHETIST, CERTIFIED REGISTERED

## 2019-11-21 PROCEDURE — 64450 NJX AA&/STRD OTHER PN/BRANCH: CPT | Mod: 59,RT,, | Performed by: ANESTHESIOLOGY

## 2019-11-21 PROCEDURE — 25000003 PHARM REV CODE 250: Performed by: STUDENT IN AN ORGANIZED HEALTH CARE EDUCATION/TRAINING PROGRAM

## 2019-11-21 PROCEDURE — 27226 PR OPEN INTERN FIX ACETABULAR WALL FX: ICD-10-PCS | Mod: RT,,, | Performed by: ORTHOPAEDIC SURGERY

## 2019-11-21 PROCEDURE — 88305 TISSUE EXAM BY PATHOLOGIST: CPT | Performed by: PATHOLOGY

## 2019-11-21 PROCEDURE — 25000003 PHARM REV CODE 250: Performed by: NURSE ANESTHETIST, CERTIFIED REGISTERED

## 2019-11-21 PROCEDURE — 76942 ECHO GUIDE FOR BIOPSY: CPT | Mod: 26,,, | Performed by: ANESTHESIOLOGY

## 2019-11-21 PROCEDURE — 88311 DECALCIFY TISSUE: CPT | Mod: 26,,, | Performed by: PATHOLOGY

## 2019-11-21 PROCEDURE — C1769 GUIDE WIRE: HCPCS | Performed by: ORTHOPAEDIC SURGERY

## 2019-11-21 PROCEDURE — 25000003 PHARM REV CODE 250: Performed by: ORTHOPAEDIC SURGERY

## 2019-11-21 PROCEDURE — 25000003 PHARM REV CODE 250: Performed by: ANESTHESIOLOGY

## 2019-11-21 PROCEDURE — 63600175 PHARM REV CODE 636 W HCPCS: Performed by: NURSE ANESTHETIST, CERTIFIED REGISTERED

## 2019-11-21 PROCEDURE — 88305 TISSUE EXAM BY PATHOLOGIST: ICD-10-PCS | Mod: 26,,, | Performed by: PATHOLOGY

## 2019-11-21 PROCEDURE — 76942 PENG SINGLE SHOT: ICD-10-PCS | Mod: 26,,, | Performed by: ANESTHESIOLOGY

## 2019-11-21 PROCEDURE — 94761 N-INVAS EAR/PLS OXIMETRY MLT: CPT

## 2019-11-21 PROCEDURE — 64450 PENG SINGLE SHOT: ICD-10-PCS | Mod: 59,RT,, | Performed by: ANESTHESIOLOGY

## 2019-11-21 PROCEDURE — 36000710: Performed by: ORTHOPAEDIC SURGERY

## 2019-11-21 PROCEDURE — 37000009 HC ANESTHESIA EA ADD 15 MINS: Performed by: ORTHOPAEDIC SURGERY

## 2019-11-21 PROCEDURE — 71000033 HC RECOVERY, INTIAL HOUR: Performed by: ORTHOPAEDIC SURGERY

## 2019-11-21 PROCEDURE — 88305 TISSUE EXAM BY PATHOLOGIST: CPT | Mod: 26,,, | Performed by: PATHOLOGY

## 2019-11-21 PROCEDURE — 99222 1ST HOSP IP/OBS MODERATE 55: CPT | Mod: 57,,, | Performed by: ORTHOPAEDIC SURGERY

## 2019-11-21 PROCEDURE — 12000002 HC ACUTE/MED SURGE SEMI-PRIVATE ROOM

## 2019-11-21 PROCEDURE — D9220A PRA ANESTHESIA: Mod: ANES,,, | Performed by: ANESTHESIOLOGY

## 2019-11-21 PROCEDURE — 71000039 HC RECOVERY, EACH ADD'L HOUR: Performed by: ORTHOPAEDIC SURGERY

## 2019-11-21 DEVICE — IMPLANTABLE DEVICE: Type: IMPLANTABLE DEVICE | Site: HIP | Status: FUNCTIONAL

## 2019-11-21 DEVICE — SCREW CORT SELF-TAP 3.5X34MM: Type: IMPLANTABLE DEVICE | Site: HIP | Status: FUNCTIONAL

## 2019-11-21 DEVICE — SCREW CORT SELF-TAP 3.5X30MM S: Type: IMPLANTABLE DEVICE | Site: HIP | Status: FUNCTIONAL

## 2019-11-21 DEVICE — SCREW CORT SELF-TAP 3.5X20MM S: Type: IMPLANTABLE DEVICE | Site: HIP | Status: FUNCTIONAL

## 2019-11-21 DEVICE — SCREW BONE 3.5X32MM SS: Type: IMPLANTABLE DEVICE | Site: HIP | Status: FUNCTIONAL

## 2019-11-21 DEVICE — SCREW BONE 3.5X24MM SS: Type: IMPLANTABLE DEVICE | Site: HIP | Status: FUNCTIONAL

## 2019-11-21 DEVICE — SCREW CORTEX SS 3.5X40MM: Type: IMPLANTABLE DEVICE | Site: HIP | Status: FUNCTIONAL

## 2019-11-21 DEVICE — SCREW CORT SELF-TAP 3.5X18MM S: Type: IMPLANTABLE DEVICE | Site: HIP | Status: FUNCTIONAL

## 2019-11-21 DEVICE — SCREW CORT SELF-TAP 3.5X16MM S: Type: IMPLANTABLE DEVICE | Site: HIP | Status: FUNCTIONAL

## 2019-11-21 RX ORDER — TRANEXAMIC ACID 100 MG/ML
3000 INJECTION, SOLUTION INTRAVENOUS ONCE
Status: DISCONTINUED | OUTPATIENT
Start: 2019-11-21 | End: 2019-11-21

## 2019-11-21 RX ORDER — AMOXICILLIN 250 MG
1 CAPSULE ORAL 2 TIMES DAILY
Status: DISCONTINUED | OUTPATIENT
Start: 2019-11-21 | End: 2019-11-23 | Stop reason: HOSPADM

## 2019-11-21 RX ORDER — LIDOCAINE HCL/PF 100 MG/5ML
SYRINGE (ML) INTRAVENOUS
Status: DISCONTINUED | OUTPATIENT
Start: 2019-11-21 | End: 2019-11-21

## 2019-11-21 RX ORDER — SODIUM CHLORIDE 0.9 % (FLUSH) 0.9 %
10 SYRINGE (ML) INJECTION
Status: DISCONTINUED | OUTPATIENT
Start: 2019-11-21 | End: 2019-11-23 | Stop reason: HOSPADM

## 2019-11-21 RX ORDER — ACETAMINOPHEN 10 MG/ML
INJECTION, SOLUTION INTRAVENOUS
Status: DISCONTINUED | OUTPATIENT
Start: 2019-11-21 | End: 2019-11-21

## 2019-11-21 RX ORDER — GLYCOPYRROLATE 0.2 MG/ML
INJECTION INTRAMUSCULAR; INTRAVENOUS
Status: DISCONTINUED | OUTPATIENT
Start: 2019-11-21 | End: 2019-11-21

## 2019-11-21 RX ORDER — SODIUM CHLORIDE 9 MG/ML
INJECTION, SOLUTION INTRAVENOUS CONTINUOUS
Status: DISCONTINUED | OUTPATIENT
Start: 2019-11-21 | End: 2019-11-21

## 2019-11-21 RX ORDER — ENOXAPARIN SODIUM 100 MG/ML
40 INJECTION SUBCUTANEOUS
Status: DISCONTINUED | OUTPATIENT
Start: 2019-11-22 | End: 2019-11-21

## 2019-11-21 RX ORDER — OXYCODONE HYDROCHLORIDE 10 MG/1
10 TABLET ORAL EVERY 4 HOURS PRN
Status: DISCONTINUED | OUTPATIENT
Start: 2019-11-21 | End: 2019-11-23 | Stop reason: HOSPADM

## 2019-11-21 RX ORDER — PROPOFOL 10 MG/ML
VIAL (ML) INTRAVENOUS
Status: DISCONTINUED | OUTPATIENT
Start: 2019-11-21 | End: 2019-11-21

## 2019-11-21 RX ORDER — ONDANSETRON 2 MG/ML
4 INJECTION INTRAMUSCULAR; INTRAVENOUS ONCE AS NEEDED
Status: DISCONTINUED | OUTPATIENT
Start: 2019-11-21 | End: 2019-11-21 | Stop reason: HOSPADM

## 2019-11-21 RX ORDER — ONDANSETRON 2 MG/ML
INJECTION INTRAMUSCULAR; INTRAVENOUS
Status: DISCONTINUED | OUTPATIENT
Start: 2019-11-21 | End: 2019-11-21

## 2019-11-21 RX ORDER — MUPIROCIN 20 MG/G
1 OINTMENT TOPICAL 2 TIMES DAILY
Status: DISCONTINUED | OUTPATIENT
Start: 2019-11-21 | End: 2019-11-23 | Stop reason: HOSPADM

## 2019-11-21 RX ORDER — MUPIROCIN 20 MG/G
OINTMENT TOPICAL
Status: DISCONTINUED | OUTPATIENT
Start: 2019-11-21 | End: 2019-11-21

## 2019-11-21 RX ORDER — ACETAMINOPHEN 500 MG
1000 TABLET ORAL EVERY 8 HOURS
Status: COMPLETED | OUTPATIENT
Start: 2019-11-21 | End: 2019-11-22

## 2019-11-21 RX ORDER — DEXMEDETOMIDINE HYDROCHLORIDE 100 UG/ML
INJECTION, SOLUTION INTRAVENOUS
Status: DISCONTINUED | OUTPATIENT
Start: 2019-11-21 | End: 2019-11-21

## 2019-11-21 RX ORDER — ROCURONIUM BROMIDE 10 MG/ML
INJECTION, SOLUTION INTRAVENOUS
Status: DISCONTINUED | OUTPATIENT
Start: 2019-11-21 | End: 2019-11-21

## 2019-11-21 RX ORDER — TRANEXAMIC ACID 100 MG/ML
INJECTION, SOLUTION INTRAVENOUS
Status: DISCONTINUED | OUTPATIENT
Start: 2019-11-21 | End: 2019-11-21

## 2019-11-21 RX ORDER — ONDANSETRON 2 MG/ML
4 INJECTION INTRAMUSCULAR; INTRAVENOUS EVERY 12 HOURS PRN
Status: DISCONTINUED | OUTPATIENT
Start: 2019-11-21 | End: 2019-11-23 | Stop reason: HOSPADM

## 2019-11-21 RX ORDER — VANCOMYCIN HYDROCHLORIDE 1 G/20ML
INJECTION, POWDER, LYOPHILIZED, FOR SOLUTION INTRAVENOUS
Status: DISCONTINUED | OUTPATIENT
Start: 2019-11-21 | End: 2019-11-21 | Stop reason: HOSPADM

## 2019-11-21 RX ORDER — PHENYLEPHRINE HYDROCHLORIDE 10 MG/ML
INJECTION INTRAVENOUS
Status: DISCONTINUED | OUTPATIENT
Start: 2019-11-21 | End: 2019-11-21

## 2019-11-21 RX ORDER — FENTANYL CITRATE 50 UG/ML
25 INJECTION, SOLUTION INTRAMUSCULAR; INTRAVENOUS EVERY 5 MIN PRN
Status: DISCONTINUED | OUTPATIENT
Start: 2019-11-21 | End: 2019-11-21

## 2019-11-21 RX ORDER — MIDAZOLAM HYDROCHLORIDE 1 MG/ML
0.5 INJECTION INTRAMUSCULAR; INTRAVENOUS
Status: DISCONTINUED | OUTPATIENT
Start: 2019-11-21 | End: 2019-11-21

## 2019-11-21 RX ORDER — DEXAMETHASONE SODIUM PHOSPHATE 4 MG/ML
INJECTION, SOLUTION INTRA-ARTICULAR; INTRALESIONAL; INTRAMUSCULAR; INTRAVENOUS; SOFT TISSUE
Status: DISCONTINUED | OUTPATIENT
Start: 2019-11-21 | End: 2019-11-21

## 2019-11-21 RX ORDER — FENTANYL CITRATE 50 UG/ML
INJECTION, SOLUTION INTRAMUSCULAR; INTRAVENOUS
Status: DISCONTINUED | OUTPATIENT
Start: 2019-11-21 | End: 2019-11-21

## 2019-11-21 RX ORDER — HYDROCODONE BITARTRATE AND ACETAMINOPHEN 500; 5 MG/1; MG/1
TABLET ORAL
Status: DISCONTINUED | OUTPATIENT
Start: 2019-11-21 | End: 2019-11-23 | Stop reason: HOSPADM

## 2019-11-21 RX ORDER — SODIUM CHLORIDE 0.9 % (FLUSH) 0.9 %
3 SYRINGE (ML) INJECTION
Status: DISCONTINUED | OUTPATIENT
Start: 2019-11-21 | End: 2019-11-21

## 2019-11-21 RX ORDER — CEFAZOLIN SODIUM 1 G/3ML
2 INJECTION, POWDER, FOR SOLUTION INTRAMUSCULAR; INTRAVENOUS
Status: COMPLETED | OUTPATIENT
Start: 2019-11-21 | End: 2019-11-21

## 2019-11-21 RX ORDER — OXYCODONE HYDROCHLORIDE 5 MG/1
5 TABLET ORAL EVERY 4 HOURS PRN
Status: DISCONTINUED | OUTPATIENT
Start: 2019-11-21 | End: 2019-11-23 | Stop reason: HOSPADM

## 2019-11-21 RX ORDER — BUPIVACAINE HYDROCHLORIDE 7.5 MG/ML
INJECTION, SOLUTION EPIDURAL; RETROBULBAR
Status: COMPLETED | OUTPATIENT
Start: 2019-11-21 | End: 2019-11-21

## 2019-11-21 RX ORDER — CEFAZOLIN SODIUM 1 G/3ML
2 INJECTION, POWDER, FOR SOLUTION INTRAMUSCULAR; INTRAVENOUS
Status: COMPLETED | OUTPATIENT
Start: 2019-11-21 | End: 2019-11-22

## 2019-11-21 RX ORDER — ENOXAPARIN SODIUM 100 MG/ML
40 INJECTION SUBCUTANEOUS
Status: DISCONTINUED | OUTPATIENT
Start: 2019-11-21 | End: 2019-11-23 | Stop reason: HOSPADM

## 2019-11-21 RX ORDER — KETAMINE HCL IN 0.9 % NACL 50 MG/5 ML
SYRINGE (ML) INTRAVENOUS
Status: DISCONTINUED | OUTPATIENT
Start: 2019-11-21 | End: 2019-11-21

## 2019-11-21 RX ORDER — FENTANYL CITRATE 50 UG/ML
25 INJECTION, SOLUTION INTRAMUSCULAR; INTRAVENOUS EVERY 5 MIN PRN
Status: DISCONTINUED | OUTPATIENT
Start: 2019-11-21 | End: 2019-11-21 | Stop reason: HOSPADM

## 2019-11-21 RX ORDER — CELECOXIB 200 MG/1
200 CAPSULE ORAL 2 TIMES DAILY
Status: DISCONTINUED | OUTPATIENT
Start: 2019-11-21 | End: 2019-11-23 | Stop reason: HOSPADM

## 2019-11-21 RX ADMIN — METHOCARBAMOL TABLETS 750 MG: 750 TABLET, COATED ORAL at 08:11

## 2019-11-21 RX ADMIN — SODIUM CHLORIDE, SODIUM GLUCONATE, SODIUM ACETATE, POTASSIUM CHLORIDE, MAGNESIUM CHLORIDE, SODIUM PHOSPHATE, DIBASIC, AND POTASSIUM PHOSPHATE: .53; .5; .37; .037; .03; .012; .00082 INJECTION, SOLUTION INTRAVENOUS at 01:11

## 2019-11-21 RX ADMIN — ROCURONIUM BROMIDE 20 MG: 10 INJECTION, SOLUTION INTRAVENOUS at 11:11

## 2019-11-21 RX ADMIN — FENTANYL CITRATE 50 MCG: 50 INJECTION, SOLUTION INTRAMUSCULAR; INTRAVENOUS at 08:11

## 2019-11-21 RX ADMIN — ROCURONIUM BROMIDE 20 MG: 10 INJECTION, SOLUTION INTRAVENOUS at 08:11

## 2019-11-21 RX ADMIN — FENTANYL CITRATE 50 MCG: 50 INJECTION INTRAMUSCULAR; INTRAVENOUS at 06:11

## 2019-11-21 RX ADMIN — ROCURONIUM BROMIDE 10 MG: 10 INJECTION, SOLUTION INTRAVENOUS at 09:11

## 2019-11-21 RX ADMIN — SODIUM CHLORIDE, SODIUM GLUCONATE, SODIUM ACETATE, POTASSIUM CHLORIDE, MAGNESIUM CHLORIDE, SODIUM PHOSPHATE, DIBASIC, AND POTASSIUM PHOSPHATE: .53; .5; .37; .037; .03; .012; .00082 INJECTION, SOLUTION INTRAVENOUS at 09:11

## 2019-11-21 RX ADMIN — OXYCODONE HYDROCHLORIDE 10 MG: 10 TABLET ORAL at 06:11

## 2019-11-21 RX ADMIN — FENTANYL CITRATE 25 MCG: 50 INJECTION, SOLUTION INTRAMUSCULAR; INTRAVENOUS at 01:11

## 2019-11-21 RX ADMIN — MUPIROCIN: 20 OINTMENT TOPICAL at 06:11

## 2019-11-21 RX ADMIN — PHENYLEPHRINE HYDROCHLORIDE 100 MCG: 10 INJECTION INTRAVENOUS at 09:11

## 2019-11-21 RX ADMIN — ROCURONIUM BROMIDE 10 MG: 10 INJECTION, SOLUTION INTRAVENOUS at 10:11

## 2019-11-21 RX ADMIN — ACETAMINOPHEN 650 MG: 325 TABLET ORAL at 06:11

## 2019-11-21 RX ADMIN — ACETAMINOPHEN 1000 MG: 10 INJECTION, SOLUTION INTRAVENOUS at 11:11

## 2019-11-21 RX ADMIN — ENOXAPARIN SODIUM 40 MG: 100 INJECTION SUBCUTANEOUS at 06:11

## 2019-11-21 RX ADMIN — MIDAZOLAM HYDROCHLORIDE 2 MG: 1 INJECTION, SOLUTION INTRAMUSCULAR; INTRAVENOUS at 06:11

## 2019-11-21 RX ADMIN — BUPIVACAINE HYDROCHLORIDE 15 ML: 7.5 INJECTION, SOLUTION EPIDURAL; RETROBULBAR at 06:11

## 2019-11-21 RX ADMIN — CEFAZOLIN 2 G: 330 INJECTION, POWDER, FOR SOLUTION INTRAMUSCULAR; INTRAVENOUS at 08:11

## 2019-11-21 RX ADMIN — SODIUM CHLORIDE 1000 ML: 0.9 INJECTION, SOLUTION INTRAVENOUS at 06:11

## 2019-11-21 RX ADMIN — CEFAZOLIN 2 G: 330 INJECTION, POWDER, FOR SOLUTION INTRAMUSCULAR; INTRAVENOUS at 11:11

## 2019-11-21 RX ADMIN — HYDROMORPHONE HYDROCHLORIDE 0.5 MG: 1 INJECTION, SOLUTION INTRAMUSCULAR; INTRAVENOUS; SUBCUTANEOUS at 04:11

## 2019-11-21 RX ADMIN — FENTANYL CITRATE 100 MCG: 50 INJECTION, SOLUTION INTRAMUSCULAR; INTRAVENOUS at 07:11

## 2019-11-21 RX ADMIN — FENTANYL CITRATE 25 MCG: 50 INJECTION, SOLUTION INTRAMUSCULAR; INTRAVENOUS at 12:11

## 2019-11-21 RX ADMIN — MUPIROCIN 1 G: 20 OINTMENT TOPICAL at 08:11

## 2019-11-21 RX ADMIN — SENNOSIDES AND DOCUSATE SODIUM 1 TABLET: 8.6; 5 TABLET ORAL at 08:11

## 2019-11-21 RX ADMIN — Medication 10 MG: at 08:11

## 2019-11-21 RX ADMIN — TRANEXAMIC ACID 1000 MG: 100 INJECTION, SOLUTION INTRAVENOUS at 08:11

## 2019-11-21 RX ADMIN — Medication 10 MG: at 09:11

## 2019-11-21 RX ADMIN — DEXAMETHASONE SODIUM PHOSPHATE 4 MG: 4 INJECTION, SOLUTION INTRAMUSCULAR; INTRAVENOUS at 07:11

## 2019-11-21 RX ADMIN — LIDOCAINE HYDROCHLORIDE 100 MG: 20 INJECTION, SOLUTION INTRAVENOUS at 07:11

## 2019-11-21 RX ADMIN — OXYCODONE HYDROCHLORIDE 15 MG: 5 TABLET ORAL at 01:11

## 2019-11-21 RX ADMIN — ROCURONIUM BROMIDE 50 MG: 10 INJECTION, SOLUTION INTRAVENOUS at 07:11

## 2019-11-21 RX ADMIN — CELECOXIB 200 MG: 200 CAPSULE ORAL at 08:11

## 2019-11-21 RX ADMIN — METHOCARBAMOL TABLETS 750 MG: 750 TABLET, COATED ORAL at 02:11

## 2019-11-21 RX ADMIN — ACETAMINOPHEN 1000 MG: 500 TABLET ORAL at 08:11

## 2019-11-21 RX ADMIN — PROPOFOL 200 MG: 10 INJECTION, EMULSION INTRAVENOUS at 07:11

## 2019-11-21 RX ADMIN — Medication 30 MG: at 07:11

## 2019-11-21 RX ADMIN — ROCURONIUM BROMIDE 20 MG: 10 INJECTION, SOLUTION INTRAVENOUS at 07:11

## 2019-11-21 RX ADMIN — DEXMEDETOMIDINE HYDROCHLORIDE 12 MCG: 100 INJECTION, SOLUTION, CONCENTRATE INTRAVENOUS at 12:11

## 2019-11-21 RX ADMIN — ONDANSETRON 4 MG: 2 INJECTION INTRAMUSCULAR; INTRAVENOUS at 11:11

## 2019-11-21 RX ADMIN — CEFAZOLIN 2 G: 1 INJECTION, POWDER, FOR SOLUTION INTRAMUSCULAR; INTRAVENOUS at 08:11

## 2019-11-21 RX ADMIN — GLYCOPYRROLATE 0.2 MG: 0.2 INJECTION, SOLUTION INTRAMUSCULAR; INTRAVENOUS at 07:11

## 2019-11-21 RX ADMIN — DEXMEDETOMIDINE HYDROCHLORIDE 12 MCG: 100 INJECTION, SOLUTION, CONCENTRATE INTRAVENOUS at 01:11

## 2019-11-21 RX ADMIN — PROPOFOL 50 MG: 10 INJECTION, EMULSION INTRAVENOUS at 08:11

## 2019-11-21 RX ADMIN — ACETAMINOPHEN 650 MG: 325 TABLET ORAL at 05:11

## 2019-11-21 NOTE — ANESTHESIA PROCEDURE NOTES
PENG Single Shot    Patient location during procedure: pre-op   Block not for primary anesthetic.  Reason for block: at surgeon's request and post-op pain management   Post-op Pain Location: Right hip pain  Start time: 11/21/2019 6:37 AM  Timeout: 11/21/2019 6:36 AM   End time: 11/21/2019 6:38 AM    Staffing  Authorizing Provider: Corrine Bergman MD  Performing Provider: Cody Sullivan MD    Preanesthetic Checklist  Completed: patient identified, site marked, surgical consent, pre-op evaluation, timeout performed, IV checked, risks and benefits discussed and monitors and equipment checked  Peripheral Block  Patient position: supine  Prep: ChloraPrep  Patient monitoring: cardiac monitor, heart rate, continuous pulse ox, continuous capnometry and frequent blood pressure checks  Block type: PENG (Supra Inguinal )  Laterality: right  Injection technique: single shot  Needle  Needle type: Stimuplex   Needle gauge: 22 G  Needle length: 2 in  Needle localization: ultrasound guidance and anatomical landmarks   -ultrasound image captured on disc.  Assessment  Injection assessment: local visualized surrounding nerve, negative parasthesia and negative aspiration  Paresthesia pain: none  Heart rate change: no  Slow fractionated injection: yes  Additional Notes  VSS. See [facility] nurse for vitals. Patient tolerated the block well. Bupivacaine diluted to 0.375% total of 30cc given. 1mg decadron, 50mcg of Clonidine, and 1:300k Epi
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
## 2019-11-21 NOTE — SUBJECTIVE & OBJECTIVE
"Principal Problem:Closed displaced fracture of posterior wall of right acetabulum    Principal Orthopedic Problem: same    Interval History: AFVSS. Pain controlled. Traction in place. NPO for surgery today.    Review of patient's allergies indicates:  No Known Allergies    Current Facility-Administered Medications   Medication    0.9%  NaCl infusion    acetaminophen tablet 650 mg    ceFAZolin injection 2 g    fentaNYL injection 25 mcg    HYDROmorphone injection 0.5 mg    lidocaine (PF) 10 mg/ml (1%) injection 10 mg    melatonin tablet 6 mg    methocarbamol tablet 750 mg    midazolam (VERSED) 1 mg/mL injection 0.5 mg    mupirocin 2 % ointment    ondansetron disintegrating tablet 8 mg    oxyCODONE immediate release tablet 10 mg    oxyCODONE immediate release tablet 15 mg    oxyCODONE immediate release tablet 5 mg    sodium chloride 0.9% flush 10 mL     Objective:     Vital Signs (Most Recent):  Temp: 97.1 °F (36.2 °C) (11/21/19 0548)  Pulse: 77 (11/21/19 0548)  Resp: 16 (11/21/19 0548)  BP: (!) 128/94 (11/21/19 0548)  SpO2: 100 % (11/21/19 0548) Vital Signs (24h Range):  Temp:  [96 °F (35.6 °C)-98.6 °F (37 °C)] 97.1 °F (36.2 °C)  Pulse:  [] 77  Resp:  [12-23] 16  SpO2:  [95 %-100 %] 100 %  BP: (108-148)/(56-94) 128/94     Weight: 77.6 kg (171 lb)  Height: 5' 9" (175.3 cm)  Body mass index is 25.25 kg/m².      Intake/Output Summary (Last 24 hours) at 11/21/2019 0557  Last data filed at 11/21/2019 0400  Gross per 24 hour   Intake --   Output 825 ml   Net -825 ml       Ortho/SPM Exam   HEENT: normocephalic, atraumatic  Resp: no increased work of breathing  CV: regular rate and rhythm  MSK: moves b/l upper extremities well    right lower extremity:  Tibial traction pin in place with 20 lb traction  Sensation intact SP/DP/T/Sural/Saphenous nerves  Motor intact EHL/FHL/TA/gastroc  Palpable DP/PT pulses      Significant Labs:   CBC:   Recent Labs   Lab 11/20/19  0346 11/21/19  0342   WBC 5.82 8.85   HGB " 14.6 13.9*   HCT 45.3 43.8    203     CMP:   Recent Labs   Lab 11/20/19 0346 11/21/19 0342    140   K 3.9 4.0    105   CO2 24 26   GLU 98 80   BUN 16 13   CREATININE 1.1 0.8   CALCIUM 9.1 8.8   PROT 8.0 6.6   ALBUMIN 4.4 3.7   BILITOT 0.4 0.8   ALKPHOS 56 50*   AST 88* 34   ALT 70* 41   ANIONGAP 15 9   EGFRNONAA >60.0 >60.0     Coagulation:   Recent Labs   Lab 11/20/19 0346   LABPROT 10.7   INR 1.0       Significant Imaging: I have reviewed all pertinent imaging results/findings.
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
## 2019-11-21 NOTE — TRANSFER OF CARE
"Anesthesia Transfer of Care Note    Patient: Michael Palacios    Procedure(s) Performed: Procedure(s) (LRB):  ORIF, FRACTURE, ACETABULUM - right - prone - pro fx (Right)    Patient location: PACU    Anesthesia Type: general    Transport from OR: Transported from OR on 6-10 L/min O2 by face mask with adequate spontaneous ventilation    Post pain: adequate analgesia    Post assessment: tolerated procedure well and no apparent anesthetic complications    Post vital signs: stable    Level of consciousness: awake, alert and oriented    Nausea/Vomiting: no nausea/vomiting    Complications: none    Transfer of care protocol was followed      Last vitals:   Visit Vitals  /74 (BP Location: Left arm, Patient Position: Lying)   Pulse 107   Temp 36.6 °C (97.8 °F) (Axillary)   Resp 18   Ht 5' 9" (1.753 m)   Wt 77.6 kg (171 lb)   SpO2 99%   BMI 25.25 kg/m²     "

## 2019-11-21 NOTE — ASSESSMENT & PLAN NOTE
Michael POPE Suzanne is a 24 y.o. male with no significant past medical history who presents with a right posterior wall ministerio transverse acetabular fracture/dislocation. S/p reduction and tibial traction pin placement in ED. To OR today for right acetabulum ORIF.    - Antibiotics: none  - Weight bearing status: bed rest, right tibial traction pin 20 lbs  - Labs: Hgb 13.9, plt 203, PT/INR wnl  - DVT Prophylaxis: mechanical, lovenox post-op  - Lines/Drains: PIV  - Pain control: multimodal  - NPO    Dispo: to OR today for right acetabulum ORIF

## 2019-11-21 NOTE — PROGRESS NOTES
"[facility]  Orthopedics  Progress Note    Patient Name: Michael Palacios  MRN: [id number]  Admission Date: 11/20/2019  Hospital Length of Stay: 1 days  Attending Provider: Morro Michelle,*  Primary Care Provider: Primary Doctor No  Follow-up For: Procedure(s) (LRB):  ORIF, FRACTURE, ACETABULUM - right - prone - pro fx (Right)    Post-Operative Day: Day of Surgery  Subjective:     Principal Problem:Closed displaced fracture of posterior wall of right acetabulum    Principal Orthopedic Problem: same    Interval History: AFVSS. Pain controlled. Traction in place. NPO for surgery today.    Review of patient's allergies indicates:  No Known Allergies    Current Facility-Administered Medications   Medication    0.9%  NaCl infusion    acetaminophen tablet 650 mg    ceFAZolin injection 2 g    fentaNYL injection 25 mcg    HYDROmorphone injection 0.5 mg    lidocaine (PF) 10 mg/ml (1%) injection 10 mg    melatonin tablet 6 mg    methocarbamol tablet 750 mg    midazolam (VERSED) 1 mg/mL injection 0.5 mg    mupirocin 2 % ointment    ondansetron disintegrating tablet 8 mg    oxyCODONE immediate release tablet 10 mg    oxyCODONE immediate release tablet 15 mg    oxyCODONE immediate release tablet 5 mg    sodium chloride 0.9% flush 10 mL     Objective:     Vital Signs (Most Recent):  Temp: 97.1 °F (36.2 °C) (11/21/19 0548)  Pulse: 77 (11/21/19 0548)  Resp: 16 (11/21/19 0548)  BP: (!) 128/94 (11/21/19 0548)  SpO2: 100 % (11/21/19 0548) Vital Signs (24h Range):  Temp:  [96 °F (35.6 °C)-98.6 °F (37 °C)] 97.1 °F (36.2 °C)  Pulse:  [] 77  Resp:  [12-23] 16  SpO2:  [95 %-100 %] 100 %  BP: (108-148)/(56-94) 128/94     Weight: 77.6 kg (171 lb)  Height: 5' 9" (175.3 cm)  Body mass index is 25.25 kg/m².      Intake/Output Summary (Last 24 hours) at 11/21/2019 0557  Last data filed at 11/21/2019 0400  Gross per 24 hour   Intake --   Output 825 ml   Net -825 ml       Ortho/SPM Exam   HEENT: " normocephalic, atraumatic  Resp: no increased work of breathing  CV: regular rate and rhythm  MSK: moves b/l upper extremities well    right lower extremity:  Tibial traction pin in place with 20 lb traction  Sensation intact SP/DP/T/Sural/Saphenous nerves  Motor intact EHL/FHL/TA/gastroc  Palpable DP/PT pulses      Significant Labs:   CBC:   Recent Labs   Lab 11/20/19 0346 11/21/19 0342   WBC 5.82 8.85   HGB 14.6 13.9*   HCT 45.3 43.8    203     CMP:   Recent Labs   Lab 11/20/19 0346 11/21/19 0342    140   K 3.9 4.0    105   CO2 24 26   GLU 98 80   BUN 16 13   CREATININE 1.1 0.8   CALCIUM 9.1 8.8   PROT 8.0 6.6   ALBUMIN 4.4 3.7   BILITOT 0.4 0.8   ALKPHOS 56 50*   AST 88* 34   ALT 70* 41   ANIONGAP 15 9   EGFRNONAA >60.0 >60.0     Coagulation:   Recent Labs   Lab 11/20/19 0346   LABPROT 10.7   INR 1.0       Significant Imaging: I have reviewed all pertinent imaging results/findings.    Assessment/Plan:     * Closed displaced fracture of posterior wall of right acetabulum  Michael Palacios is a 24 y.o. male with no significant past medical history who presents with a right posterior wall ministerio transverse acetabular fracture/dislocation. S/p reduction and tibial traction pin placement in ED. To OR today for right acetabulum ORIF.    - Antibiotics: none  - Weight bearing status: bed rest, right tibial traction pin 20 lbs  - Labs: Hgb 13.9, plt 203, PT/INR wnl  - DVT Prophylaxis: mechanical, lovenox post-op  - Lines/Drains: PIV  - Pain control: multimodal  - NPO    Dispo: to OR today for right acetabulum ORIF            Catherine Clark MD  Orthopedics  [facility]

## 2019-11-22 LAB
BASOPHILS # BLD AUTO: 0.05 K/UL (ref 0–0.2)
BASOPHILS NFR BLD: 0.5 % (ref 0–1.9)
DIFFERENTIAL METHOD: ABNORMAL
EOSINOPHIL # BLD AUTO: 0 K/UL (ref 0–0.5)
EOSINOPHIL NFR BLD: 0.4 % (ref 0–8)
ERYTHROCYTE [DISTWIDTH] IN BLOOD BY AUTOMATED COUNT: 12.6 % (ref 11.5–14.5)
HCT VFR BLD AUTO: 36.8 % (ref 40–54)
HGB BLD-MCNC: 11.7 G/DL (ref 14–18)
IMM GRANULOCYTES # BLD AUTO: 0.03 K/UL (ref 0–0.04)
IMM GRANULOCYTES NFR BLD AUTO: 0.3 % (ref 0–0.5)
LYMPHOCYTES # BLD AUTO: 1.3 K/UL (ref 1–4.8)
LYMPHOCYTES NFR BLD: 13.2 % (ref 18–48)
MCH RBC QN AUTO: 28.3 PG (ref 27–31)
MCHC RBC AUTO-ENTMCNC: 31.8 G/DL (ref 32–36)
MCV RBC AUTO: 89 FL (ref 82–98)
MONOCYTES # BLD AUTO: 0.8 K/UL (ref 0.3–1)
MONOCYTES NFR BLD: 8 % (ref 4–15)
NEUTROPHILS # BLD AUTO: 7.7 K/UL (ref 1.8–7.7)
NEUTROPHILS NFR BLD: 77.6 % (ref 38–73)
NRBC BLD-RTO: 0 /100 WBC
PLATELET # BLD AUTO: 212 K/UL (ref 150–350)
PMV BLD AUTO: 11.7 FL (ref 9.2–12.9)
RBC # BLD AUTO: 4.14 M/UL (ref 4.6–6.2)
WBC # BLD AUTO: 9.93 K/UL (ref 3.9–12.7)

## 2019-11-22 PROCEDURE — 25000003 PHARM REV CODE 250: Performed by: ORTHOPAEDIC SURGERY

## 2019-11-22 PROCEDURE — 97161 PT EVAL LOW COMPLEX 20 MIN: CPT

## 2019-11-22 PROCEDURE — 63600175 PHARM REV CODE 636 W HCPCS: Performed by: ORTHOPAEDIC SURGERY

## 2019-11-22 PROCEDURE — 97530 THERAPEUTIC ACTIVITIES: CPT

## 2019-11-22 PROCEDURE — 97165 OT EVAL LOW COMPLEX 30 MIN: CPT

## 2019-11-22 PROCEDURE — 85025 COMPLETE CBC W/AUTO DIFF WBC: CPT

## 2019-11-22 PROCEDURE — 97116 GAIT TRAINING THERAPY: CPT

## 2019-11-22 PROCEDURE — 36415 COLL VENOUS BLD VENIPUNCTURE: CPT

## 2019-11-22 PROCEDURE — 12000002 HC ACUTE/MED SURGE SEMI-PRIVATE ROOM

## 2019-11-22 PROCEDURE — 63600175 PHARM REV CODE 636 W HCPCS: Performed by: STUDENT IN AN ORGANIZED HEALTH CARE EDUCATION/TRAINING PROGRAM

## 2019-11-22 RX ADMIN — SENNOSIDES AND DOCUSATE SODIUM 1 TABLET: 8.6; 5 TABLET ORAL at 10:11

## 2019-11-22 RX ADMIN — ENOXAPARIN SODIUM 40 MG: 100 INJECTION SUBCUTANEOUS at 10:11

## 2019-11-22 RX ADMIN — OXYCODONE HYDROCHLORIDE 10 MG: 10 TABLET ORAL at 08:11

## 2019-11-22 RX ADMIN — CEFAZOLIN 2 G: 1 INJECTION, POWDER, FOR SOLUTION INTRAMUSCULAR; INTRAVENOUS at 04:11

## 2019-11-22 RX ADMIN — OXYCODONE HYDROCHLORIDE 10 MG: 10 TABLET ORAL at 12:11

## 2019-11-22 RX ADMIN — MUPIROCIN 1 G: 20 OINTMENT TOPICAL at 10:11

## 2019-11-22 RX ADMIN — ACETAMINOPHEN 1000 MG: 500 TABLET ORAL at 05:11

## 2019-11-22 RX ADMIN — METHOCARBAMOL TABLETS 750 MG: 750 TABLET, COATED ORAL at 02:11

## 2019-11-22 RX ADMIN — SENNOSIDES AND DOCUSATE SODIUM 1 TABLET: 8.6; 5 TABLET ORAL at 08:11

## 2019-11-22 RX ADMIN — ACETAMINOPHEN 650 MG: 325 TABLET ORAL at 12:11

## 2019-11-22 RX ADMIN — MUPIROCIN 1 G: 20 OINTMENT TOPICAL at 08:11

## 2019-11-22 RX ADMIN — ACETAMINOPHEN 650 MG: 325 TABLET ORAL at 10:11

## 2019-11-22 RX ADMIN — ACETAMINOPHEN 650 MG: 325 TABLET ORAL at 05:11

## 2019-11-22 RX ADMIN — METHOCARBAMOL TABLETS 750 MG: 750 TABLET, COATED ORAL at 10:11

## 2019-11-22 RX ADMIN — CELECOXIB 200 MG: 200 CAPSULE ORAL at 10:11

## 2019-11-22 RX ADMIN — METHOCARBAMOL TABLETS 750 MG: 750 TABLET, COATED ORAL at 08:11

## 2019-11-22 RX ADMIN — CELECOXIB 200 MG: 200 CAPSULE ORAL at 08:11

## 2019-11-22 RX ADMIN — ACETAMINOPHEN 1000 MG: 500 TABLET ORAL at 02:11

## 2019-11-22 NOTE — PLAN OF CARE
CM to bedside - pt provided assessment info. Pt w/ a cane in place, lives w/ dad. Pt's therapy recs are pending - expected to d/c w/ h/h and DME. Pt lives in a 2 story apt w/ 1 step to enter. Pt states his bedroom and bathroom are upstairs but his father will assist w/ arranging downstairs to accommodate pt and a bathroom is available downstairs.    CM provided patient anticipated BENJI which will be update by nursing staff. Patient provided a Discharge Planning booklet. Patient verbalized understanding.    Ortho ROBERT RIVAS [id number]     11/22/19 1046   Discharge Assessment   Assessment Type Discharge Planning Assessment   Confirmed/corrected address and phone number on facesheet? Yes   Assessment information obtained from? Patient   Expected Length of Stay (days) 3   Communicated expected length of stay with patient/caregiver yes   Prior to hospitilization cognitive status: Alert/Oriented   Prior to hospitalization functional status: Independent   Current cognitive status: Alert/Oriented   Current Functional Status: Assistive Equipment;Needs Assistance   Facility Arrived From: N/A   Lives With parent(s)   Is patient able to care for self after discharge? Unable to determine at this time (comments)   Who are your caregiver(s) and their phone number(s)? father - José [phone] S/O - Alvin [phone]   Patient's perception of discharge disposition home health   Readmission Within the Last 30 Days no previous admission in last 30 days   Patient currently being followed by outpatient case management? No   Patient currently receives any other outside agency services? No   Equipment Currently Used at Home cane, straight   Do you have any problems affording any of your prescribed medications? No   Is the patient taking medications as prescribed? yes   Does the patient have transportation home? Yes  (pt's father will assist w/ transport)   Transportation Anticipated car, drives self;family or friend will provide    Dialysis Name and Scheduled days N/A   Does the patient receive services at the [facility]? No   Discharge Plan A Home with family;Home Health   Discharge Plan B Rehab   DME Needed Upon Discharge  other (see comments)  (TBD)   Patient/Family in Agreement with Plan yes

## 2019-11-22 NOTE — PLAN OF CARE
Pt A & O x4, VS stable, O2 dc'd as pt sats 97% on RA, hip adductor in place, pt positions independently within bed, fall precautions in place, pain monitored and controlled with scheduled and PRN medication, no signs of infection, incision CDI, foot SCDs in place, call light within reach, fiance at bedside.

## 2019-11-22 NOTE — PT/OT/SLP EVAL
Physical Therapy Evaluation    Patient Name:  Michael Palacios   MRN:  [id number]    Recommendations:     Discharge Recommendations:  home health PT   Discharge Equipment Recommendations: bedside commode, bath bench   Barriers to discharge: None    Assessment:     Michael Palacios is a 24 y.o. male admitted with a medical diagnosis of Closed displaced associated transverse-posterior fracture of right acetabulum.  He presents with the following impairments/functional limitations:  weakness, gait instability, decreased ROM, impaired endurance, impaired balance, decreased lower extremity function, pain, impaired self care skills, orthopedic precautions, impaired functional mobilty.Pt is independent and active, drives and works at baseline. Based on assessment, pt CGA-Atul x 1 for bed mobility, transfers and ambulation with RW, required increased cuing for weight bearing status and posterior hip precautions. At this time, anticipate that pt will be appropriate to discharge home with PT and assist of family as needed. Will continue to follow while in house.     Rehab Prognosis: Good; patient would benefit from acute skilled PT services to address these deficits and reach maximum level of function.    Recent Surgery: Procedure(s) (LRB):  ORIF, FRACTURE, ACETABULUM - right - prone - pro fx (Right) 1 Day Post-Op    Plan:     During this hospitalization, patient to be seen   to address the identified rehab impairments via gait training, therapeutic activities, therapeutic exercises, neuromuscular re-education and progress toward the following goals:    · Plan of Care Expires:  12/22/19    Subjective     Chief Complaint: Pain   Patient/Family Comments/goals: To return home with family   Pain/Comfort:  · Pain Rating 1: 2/10  · Location - Side 1: Right  · Location - Orientation 1: generalized  · Location 1: leg  · Pain Addressed 1: Pre-medicate for activity, Reposition, Distraction, Cessation of Activity  · Pain Rating Post-Intervention  1: 5/10    Patients cultural, spiritual, Anabaptism conflicts given the current situation: no    Living Environment:  Prior to admission, patients level of function was independent, drives, works two jobs. Lives with dad in a two story home (will be staying on main level in living room) with 1 ZAIRE.  Equipment used at home: walker, rolling, wheelchair, crutches, shower chair(Fiolamide has access to all listed equipment ).  Upon discharge, patient will have assistance from parents and fiance.    Objective:     Communicated with RN prior to session.  Patient found supine with FCD  upon PT entry to room.    General Precautions: Standard, fall   Orthopedic Precautions:RLE toe touch weight bearing, RLE posterior precautions   Braces: N/A     Exams:  · Cognitive Exam:  Patient is oriented to Person, Place, Time and Situation  · RLE Strength: Pt 2+/5 based on gross assessment, limited by pain, decreased ROM and weakness  · LLE Strength: WFL    Functional Mobility:  · Bed Mobility:     · Supine to Sit: minimum assistance, performed in segments due to pain, guidance for RLE required   · Sit to Supine: minimum assistance for LEs  · Transfers:     · Sit to Stand:  minimum assistance with rolling walker  · Gait: 40' x 2 with RW with CGA; pt with antalgic gait and decreased step length through RLE. Increased cuing for sequencing with RW and turning in segments to avoid pivoting on RLE.   · Balance: Good with RW, no LOBs and falls       Therapeutic Activities and Exercises:   Pt educated on: PT role/POC; safety with mobility; benefits of OOB activities; posterior hip precautions; weight-bearing status and sequencing with RW; discharge recommendations. Pt and family verbalized understanding.       AM-PAC 6 CLICK MOBILITY  Total Score:17     Patient left supine with all lines intact, call button in reach, RN notified and family present.    GOALS:   Multidisciplinary Problems     Physical Therapy Goals        Problem: Physical Therapy  Goal    Goal Priority Disciplines Outcome Goal Variances Interventions   Physical Therapy Goal     PT, PT/OT Ongoing, Progressing     Description:  Goals to be met by: 2019    Patient will increase functional independence with mobility by performin. Supine to sit with [place]  2. Sit to supine with [place]  3. Sit to stand transfer with [place]  4. Bed to chair transfer with Modified [place] using Rolling Walker  5. Gait  x 100 feet with Modified [place] using Rolling Walker.   6. Ascend/descend 1 stair with modified independence using Rolling Walker                      History:     Past Medical History:   Diagnosis Date    Anxiety     Depression     History of psychiatric care     Psychiatric problem     Therapy        Past Surgical History:   Procedure Laterality Date    APPENDECTOMY      OPEN REDUCTION AND INTERNAL FIXATION (ORIF) OF FRACTURE OF ACETABULUM Right 2019    Procedure: ORIF, FRACTURE, ACETABULUM - right - prone - pro fx;  Surgeon: Morro Michelle MD;  Location: [facility] OR [address] [place] [address];  Service: Orthopedics;  Laterality: Right;       Time Tracking:     PT Received On: 19  PT Start Time: 1028     PT Stop Time: 1050  PT Total Time (min): 22 min     Billable Minutes: Evaluation 12 min  and Gait Training 10 min      Cassandra Arora, PT, DPT  2019

## 2019-11-22 NOTE — PROGRESS NOTES
"[facility]  Orthopedics  Progress Note    Patient Name: Michael Palacios  MRN: [id number]  Admission Date: 11/20/2019  Hospital Length of Stay: 2 days  Attending Provider: Morro Michelle,*  Primary Care Provider: Primary Doctor No  Follow-up For: Procedure(s) (LRB):  ORIF, FRACTURE, ACETABULUM - right - prone - pro fx (Right)    Post-Operative Day: 1 Day Post-Op  Subjective:     Principal Problem:Closed displaced associated transverse-posterior fracture of right acetabulum    Principal Orthopedic Problem: same    Interval History: AFVSS. Pain controlled. Tolerating diet.     Review of patient's allergies indicates:  No Known Allergies       Current Facility-Administered Medications   Medication    0.9%  NaCl infusion (for blood administration)    acetaminophen tablet 1,000 mg    acetaminophen tablet 650 mg    celecoxib capsule 200 mg    enoxaparin injection 40 mg    HYDROmorphone injection 0.5 mg    lidocaine (PF) 10 mg/ml (1%) injection 10 mg    melatonin tablet 6 mg    methocarbamol tablet 750 mg    mupirocin 2 % ointment 1 g    ondansetron disintegrating tablet 8 mg    ondansetron injection 4 mg    oxyCODONE immediate release tablet 10 mg    oxyCODONE immediate release tablet 10 mg    oxyCODONE immediate release tablet 15 mg    oxyCODONE immediate release tablet 5 mg    oxyCODONE immediate release tablet 5 mg    senna-docusate 8.6-50 mg per tablet 1 tablet    sodium chloride 0.9% flush 10 mL    sodium chloride 0.9% flush 10 mL     Objective:     Vital Signs (Most Recent):  Temp: 96.7 °F (35.9 °C) (11/22/19 0410)  Pulse: 80 (11/22/19 0410)  Resp: 16 (11/22/19 0410)  BP: (!) 109/59 (11/22/19 0410)  SpO2: 98 % (11/22/19 0410) Vital Signs (24h Range):  Temp:  [96.2 °F (35.7 °C)-97.8 °F (36.6 °C)] 96.7 °F (35.9 °C)  Pulse:  [] 80  Resp:  [14-18] 16  SpO2:  [96 %-100 %] 98 %  BP: ()/(51-86) 109/59     Weight: 77.6 kg (171 lb)  Height: 5' 9" (175.3 cm)  Body mass " index is 25.25 kg/m².      Intake/Output Summary (Last 24 hours) at 11/22/2019 0726  Last data filed at 11/22/2019 0500  Gross per 24 hour   Intake 2240 ml   Output 1820 ml   Net 420 ml       Ortho/SPM Exam     HEENT: normocephalic, atraumatic  Resp: no increased work of breathing  CV: regular rate and rhythm  MSK: moves b/l upper extremities well    right lower extremity:  Aquacel dressing c/d/i  Femoral traction pin dressings clean/dry  Sensation intact SP/DP/T/Sural/Saphenous nerves  Motor intact EHL/FHL/TA/gastroc  Palpable DP/PT pulses      Significant Labs:   CBC:   Recent Labs   Lab 11/21/19  0342 11/21/19  1353 11/22/19  0458   WBC 8.85 17.74*  17.74* 9.93   HGB 13.9* 13.7*  13.7* 11.7*   HCT 43.8 42.8  42.8 36.8*    292  292 212     CMP:   Recent Labs   Lab 11/21/19  0342 11/21/19  1353    139   K 4.0 4.0    105   CO2 26 20*   GLU 80 204*   BUN 13 13   CREATININE 0.8 1.1   CALCIUM 8.8 8.5*   PROT 6.6  --    ALBUMIN 3.7  --    BILITOT 0.8  --    ALKPHOS 50*  --    AST 34  --    ALT 41  --    ANIONGAP 9 14   EGFRNONAA >60.0 >60.0       Significant Imaging: I have reviewed all pertinent imaging results/findings.    Assessment/Plan:     * Closed displaced associated transverse-posterior fracture of right acetabulum  Michael Palacios is a 24 y.o. male with no significant past medical history who presents with a right posterior wall ministerio transverse acetabular fracture/dislocation. S/p ORIF 11/21.    - Antibiotics: post-op  - Weight bearing status: TTWB  - Labs: Hgb 11.7  - DVT Prophylaxis: mechanical, lovenox   - Lines/Drains: PIV, d/c arriaga today  - Pain control: multimodal    Dispo: pending progress with PT            Catherine Clark MD  Orthopedics  [facility]

## 2019-11-22 NOTE — PLAN OF CARE
OT kimberlee complete. Pt tolerated session well. Pt's functional outcomes have been established today based on his presenting functional level.       Problem: Occupational Therapy Goal  Goal: Occupational Therapy Goal  Description  Goals to be met by: 11/29/2019    Patient will increase functional independence with ADLs by performing:    UE Dressing with [place].  LE Dressing with Supervision with AD.   Grooming while standing at sink with Supervision.  Toileting from toilet with Supervision for hygiene and clothing management.   Stand pivot transfers with Supervision and AD.  Toilet transfer to toilet with Supervision and AD.  Pt will independently recall 3/3 posterior hip precautions        Outcome: Ongoing, Progressing

## 2019-11-22 NOTE — PLAN OF CARE
Problem: Physical Therapy Goal  Goal: Physical Therapy Goal  Description  Goals to be met by: 2019    Patient will increase functional independence with mobility by performin. Supine to sit with [place]  2. Sit to supine with [place]  3. Sit to stand transfer with [place]  4. Bed to chair transfer with Modified [place] using Rolling Walker  5. Gait  x 100 feet with Modified [place] using Rolling Walker.   6. Ascend/descend 1 stair with modified independence using Rolling Walker     Outcome: Ongoing, Progressing     PT evaluation completed, goals appropriate. Will continue to follow while in house.    Cassandra Arora, PT, DPT  2019

## 2019-11-22 NOTE — SUBJECTIVE & OBJECTIVE
"Principal Problem:Closed displaced associated transverse-posterior fracture of right acetabulum    Principal Orthopedic Problem: same    Interval History: AFVSS. Pain controlled. Tolerating diet.     Review of patient's allergies indicates:  No Known Allergies       Current Facility-Administered Medications   Medication    0.9%  NaCl infusion (for blood administration)    acetaminophen tablet 1,000 mg    acetaminophen tablet 650 mg    celecoxib capsule 200 mg    enoxaparin injection 40 mg    HYDROmorphone injection 0.5 mg    lidocaine (PF) 10 mg/ml (1%) injection 10 mg    melatonin tablet 6 mg    methocarbamol tablet 750 mg    mupirocin 2 % ointment 1 g    ondansetron disintegrating tablet 8 mg    ondansetron injection 4 mg    oxyCODONE immediate release tablet 10 mg    oxyCODONE immediate release tablet 10 mg    oxyCODONE immediate release tablet 15 mg    oxyCODONE immediate release tablet 5 mg    oxyCODONE immediate release tablet 5 mg    senna-docusate 8.6-50 mg per tablet 1 tablet    sodium chloride 0.9% flush 10 mL    sodium chloride 0.9% flush 10 mL     Objective:     Vital Signs (Most Recent):  Temp: 96.7 °F (35.9 °C) (11/22/19 0410)  Pulse: 80 (11/22/19 0410)  Resp: 16 (11/22/19 0410)  BP: (!) 109/59 (11/22/19 0410)  SpO2: 98 % (11/22/19 0410) Vital Signs (24h Range):  Temp:  [96.2 °F (35.7 °C)-97.8 °F (36.6 °C)] 96.7 °F (35.9 °C)  Pulse:  [] 80  Resp:  [14-18] 16  SpO2:  [96 %-100 %] 98 %  BP: ()/(51-86) 109/59     Weight: 77.6 kg (171 lb)  Height: 5' 9" (175.3 cm)  Body mass index is 25.25 kg/m².      Intake/Output Summary (Last 24 hours) at 11/22/2019 0726  Last data filed at 11/22/2019 0500  Gross per 24 hour   Intake 2240 ml   Output 1820 ml   Net 420 ml       Ortho/SPM Exam     HEENT: normocephalic, atraumatic  Resp: no increased work of breathing  CV: regular rate and rhythm  MSK: moves b/l upper extremities well    right lower extremity:  Aquacel dressing " c/d/i  Femoral traction pin dressings clean/dry  Sensation intact SP/DP/T/Sural/Saphenous nerves  Motor intact EHL/FHL/TA/gastroc  Palpable DP/PT pulses      Significant Labs:   CBC:   Recent Labs   Lab 11/21/19  0342 11/21/19  1353 11/22/19  0458   WBC 8.85 17.74*  17.74* 9.93   HGB 13.9* 13.7*  13.7* 11.7*   HCT 43.8 42.8  42.8 36.8*    292  292 212     CMP:   Recent Labs   Lab 11/21/19 0342 11/21/19  1353    139   K 4.0 4.0    105   CO2 26 20*   GLU 80 204*   BUN 13 13   CREATININE 0.8 1.1   CALCIUM 8.8 8.5*   PROT 6.6  --    ALBUMIN 3.7  --    BILITOT 0.8  --    ALKPHOS 50*  --    AST 34  --    ALT 41  --    ANIONGAP 9 14   EGFRNONAA >60.0 >60.0       Significant Imaging: I have reviewed all pertinent imaging results/findings.

## 2019-11-22 NOTE — ASSESSMENT & PLAN NOTE
Michael Palacios is a 24 y.o. male with no significant past medical history who presents with a right posterior wall ministerio transverse acetabular fracture/dislocation. S/p ORIF 11/21.    - Antibiotics: post-op  - Weight bearing status: TTWB  - Labs: Hgb 11.7  - DVT Prophylaxis: mechanical, lovenox   - Lines/Drains: PIV, d/c arriaga today  - Pain control: multimodal    Dispo: pending progress with PT

## 2019-11-22 NOTE — PT/OT/SLP EVAL
Occupational Therapy   Evaluation/ Treatment    Name: Michael Palacios  MRN: [id number]  Admitting Diagnosis:  Closed displaced associated transverse-posterior fracture of right acetabulum 1 Day Post-Op    Recommendations:     Discharge Recommendations: home with home health  Discharge Equipment Recommendations:  hip kit, bedside commode, bath bench  Barriers to discharge:  None    Assessment:     Michael Palacios is a 24 y.o. male with a medical diagnosis of Closed displaced associated transverse-posterior fracture of right acetabulum.  He presents with the following performance deficits affecting function: weakness, impaired endurance, impaired self care skills, impaired functional mobilty, gait instability, impaired balance, decreased lower extremity function, pain, orthopedic precautions, impaired skin, edema.      OT eval complete. Pt tolerated session well w/ moderate pain reported. Pt presents w/ good overall B UE strength and ROM. Pt received extensive education on all posterior hip precautions, TTWB on R LE, and on items and importance of using the hip kit. Pt performed all functional mobility w/ min A - close CGA demonstrating good understanding of TTWB technique and adherence to hip precautions w/ activity. Pt continues to benefit from skilled OT to address the above listed impairments.     Rehab Prognosis: Good; patient would benefit from acute skilled OT services to address these deficits and reach maximum level of function.       Plan:     Patient to be seen daily to address the above listed problems via self-care/home management, therapeutic activities, therapeutic exercises  · Plan of Care Expires: 12/21/19  · Plan of Care Reviewed with: patient, father, significant other    Subjective     Chief Complaint: pain  Patient/Family Comments/goals: to get better    Occupational Profile:  Living Environment: Pt lives with his father in 2 story town home w/ 1 ZAIRE; pt will be residing on the 1st floor during recovery.  He uses a tub/shower  Previous level of function: PTA, pt reports being independent w/ ADLs and functional mobility   Roles and Routines: son, brother, erica, lloyd &   Equipment Used at Home:  wheelchair, walker, rolling, shower chair, crutches(erica has access to all the above listed AD)  Assistance upon Discharge: pt will have assistance from family upon d/c    Pain/Comfort:  · Pain Rating 1: 2/10  · Location - Side 1: Right  · Location - Orientation 1: generalized  · Location 1: leg  · Pain Addressed 1: Pre-medicate for activity, Reposition, Distraction, Cessation of Activity, Nurse notified  · Pain Rating Post-Intervention 1: 5/10    Patients cultural, spiritual, Spiritism conflicts given the current situation: no    Objective:     Communicated with: RN prior to session.  Patient found HOB elevated with FCD upon OT entry to room.    General Precautions: Standard, fall   Orthopedic Precautions:RLE toe touch weight bearing, RLE posterior precautions   Braces: N/A     Occupational Performance:    Bed Mobility:    · Patient completed Scooting/Bridging with minimum assistance  · Patient completed Supine to Sit with minimum assistance  · Patient completed Sit to Supine with minimum assistance    Functional Mobility/Transfers:  · Patient completed Sit <> Stand Transfer with minimum assistance  with  rolling walker   · Functional Mobility: Pt ambulated 40ft x2 w/ CGA using RW for increased stability and safety. Pt received education on TTWB technique and demonstrated good understanding    Activities of Daily Living:  · N/a - pt educated on all items of hip kit and purpose for each     Cognitive/Visual Perceptual:  Cognitive/Psychosocial Skills:     -       Oriented to: Person, Place and Time   -       Follows Commands/attention:Follows multistep  commands  -       Communication: clear/fluent    Physical Exam:  Upper Extremity Range of Motion:     -       Right Upper Extremity: WFL  -       Left Upper  Extremity: WFL  Upper Extremity Strength:    -       Right Upper Extremity: WFL  -       Left Upper Extremity: WFL   Strength:    -       Right Upper Extremity: WFL  -       Left Upper Extremity: WFL  Fine Motor Coordination:    -       Intact    AMPAC 6 Click ADL:  AMPAC Total Score: 18    Treatment & Education:  - OT role/POC  - Importance of OOB activity to maximize recovery   - Educated on TTWB and posterior hip precautions via verbalization, demonstration, and handout provided  - Educated on bed mobility, transfers, and gait sequence w/ technique to ensure all precautions are maintained  - Verbally educated on all items of hip kit and purpose of each  - all questions from pt and family answered within OT scope of practice  Education:    Patient left HOB elevated with all lines intact, call button in reach, RN notified and fiance and father present    GOALS:   Multidisciplinary Problems     Occupational Therapy Goals        Problem: Occupational Therapy Goal    Goal Priority Disciplines Outcome Interventions   Occupational Therapy Goal     OT, PT/OT Ongoing, Progressing    Description:  Goals to be met by: 11/29/2019    Patient will increase functional independence with ADLs by performing:    UE Dressing with [place].  LE Dressing with Supervision with AD.   Grooming while standing at sink with Supervision.  Toileting from toilet with Supervision for hygiene and clothing management.   Stand pivot transfers with Supervision and AD.  Toilet transfer to toilet with Supervision and AD.  Pt will independently recall 3/3 posterior hip precautions                         History:     Past Medical History:   Diagnosis Date    Anxiety     Depression     History of psychiatric care     Psychiatric problem     Therapy        Past Surgical History:   Procedure Laterality Date    APPENDECTOMY      OPEN REDUCTION AND INTERNAL FIXATION (ORIF) OF FRACTURE OF ACETABULUM Right 11/21/2019    Procedure: ORIF,  FRACTURE, ACETABULUM - right - prone - pro fx;  Surgeon: Morro Michelle MD;  Location: [facility] OR [address] [place] [address];  Service: Orthopedics;  Laterality: Right;       Time Tracking:     OT Date of Treatment: 11/22/19  OT Start Time: 1028  OT Stop Time: 1050  OT Total Time (min): 22 min    Billable Minutes:Evaluation 10  Therapeutic Activity 12    Zahra Navarrete OT  11/22/2019

## 2019-11-23 VITALS
TEMPERATURE: 98 F | HEART RATE: 76 BPM | WEIGHT: 171 LBS | SYSTOLIC BLOOD PRESSURE: 125 MMHG | OXYGEN SATURATION: 96 % | HEIGHT: 69 IN | RESPIRATION RATE: 14 BRPM | DIASTOLIC BLOOD PRESSURE: 75 MMHG | BODY MASS INDEX: 25.33 KG/M2

## 2019-11-23 LAB
BASOPHILS # BLD AUTO: 0.07 K/UL (ref 0–0.2)
BASOPHILS NFR BLD: 1 % (ref 0–1.9)
DIFFERENTIAL METHOD: ABNORMAL
EOSINOPHIL # BLD AUTO: 0.2 K/UL (ref 0–0.5)
EOSINOPHIL NFR BLD: 2 % (ref 0–8)
ERYTHROCYTE [DISTWIDTH] IN BLOOD BY AUTOMATED COUNT: 12.8 % (ref 11.5–14.5)
HCT VFR BLD AUTO: 35.7 % (ref 40–54)
HGB BLD-MCNC: 11.7 G/DL (ref 14–18)
IMM GRANULOCYTES # BLD AUTO: 0.03 K/UL (ref 0–0.04)
IMM GRANULOCYTES NFR BLD AUTO: 0.4 % (ref 0–0.5)
LYMPHOCYTES # BLD AUTO: 1.2 K/UL (ref 1–4.8)
LYMPHOCYTES NFR BLD: 16.6 % (ref 18–48)
MCH RBC QN AUTO: 29 PG (ref 27–31)
MCHC RBC AUTO-ENTMCNC: 32.8 G/DL (ref 32–36)
MCV RBC AUTO: 88 FL (ref 82–98)
MONOCYTES # BLD AUTO: 0.6 K/UL (ref 0.3–1)
MONOCYTES NFR BLD: 8.2 % (ref 4–15)
NEUTROPHILS # BLD AUTO: 5.3 K/UL (ref 1.8–7.7)
NEUTROPHILS NFR BLD: 71.8 % (ref 38–73)
NRBC BLD-RTO: 0 /100 WBC
PLATELET # BLD AUTO: 215 K/UL (ref 150–350)
PMV BLD AUTO: 11.6 FL (ref 9.2–12.9)
RBC # BLD AUTO: 4.04 M/UL (ref 4.6–6.2)
WBC # BLD AUTO: 7.34 K/UL (ref 3.9–12.7)

## 2019-11-23 PROCEDURE — 85025 COMPLETE CBC W/AUTO DIFF WBC: CPT

## 2019-11-23 PROCEDURE — 25000003 PHARM REV CODE 250: Performed by: ORTHOPAEDIC SURGERY

## 2019-11-23 PROCEDURE — 97530 THERAPEUTIC ACTIVITIES: CPT

## 2019-11-23 PROCEDURE — 97116 GAIT TRAINING THERAPY: CPT

## 2019-11-23 PROCEDURE — 36415 COLL VENOUS BLD VENIPUNCTURE: CPT

## 2019-11-23 RX ORDER — DOCUSATE SODIUM 100 MG/1
100 CAPSULE, LIQUID FILLED ORAL 2 TIMES DAILY
Qty: 60 CAPSULE | Refills: 0 | Status: SHIPPED | OUTPATIENT
Start: 2019-11-23

## 2019-11-23 RX ORDER — DOCUSATE SODIUM 100 MG/1
100 CAPSULE, LIQUID FILLED ORAL 2 TIMES DAILY
Qty: 60 CAPSULE | Refills: 0 | OUTPATIENT
Start: 2019-11-23 | End: 2019-11-23 | Stop reason: SDUPTHER

## 2019-11-23 RX ORDER — OXYCODONE AND ACETAMINOPHEN 10; 325 MG/1; MG/1
1 TABLET ORAL EVERY 4 HOURS PRN
Qty: 45 TABLET | Refills: 0 | Status: SHIPPED | OUTPATIENT
Start: 2019-11-23 | End: 2019-11-23 | Stop reason: SDUPTHER

## 2019-11-23 RX ORDER — OXYCODONE AND ACETAMINOPHEN 10; 325 MG/1; MG/1
1 TABLET ORAL EVERY 4 HOURS PRN
Qty: 45 TABLET | Refills: 0 | Status: SHIPPED | OUTPATIENT
Start: 2019-11-23

## 2019-11-23 RX ORDER — ASPIRIN 81 MG/1
81 TABLET ORAL 2 TIMES DAILY
Qty: 82 TABLET | Refills: 0 | Status: SHIPPED | OUTPATIENT
Start: 2019-11-23 | End: 2020-01-03

## 2019-11-23 RX ORDER — ONDANSETRON 4 MG/1
4 TABLET, FILM COATED ORAL EVERY 6 HOURS PRN
Qty: 12 TABLET | Refills: 0 | Status: SHIPPED | OUTPATIENT
Start: 2019-11-23

## 2019-11-23 RX ADMIN — OXYCODONE HYDROCHLORIDE 10 MG: 10 TABLET ORAL at 11:11

## 2019-11-23 RX ADMIN — OXYCODONE HYDROCHLORIDE 5 MG: 5 TABLET ORAL at 04:11

## 2019-11-23 RX ADMIN — MUPIROCIN 1 G: 20 OINTMENT TOPICAL at 09:11

## 2019-11-23 RX ADMIN — SENNOSIDES AND DOCUSATE SODIUM 1 TABLET: 8.6; 5 TABLET ORAL at 10:11

## 2019-11-23 RX ADMIN — METHOCARBAMOL TABLETS 750 MG: 750 TABLET, COATED ORAL at 10:11

## 2019-11-23 RX ADMIN — METHOCARBAMOL TABLETS 750 MG: 750 TABLET, COATED ORAL at 02:11

## 2019-11-23 RX ADMIN — CELECOXIB 200 MG: 200 CAPSULE ORAL at 10:11

## 2019-11-23 RX ADMIN — ACETAMINOPHEN 650 MG: 325 TABLET ORAL at 11:11

## 2019-11-23 NOTE — PLAN OF CARE
Home health scheduled with [facility] ([phone]).   [facility] representative will contact patient later today and see patient tomorrow.   Bedside commode ordered with [facility].   Patient declined walker and transfer bench and says he has obtained equipment.

## 2019-11-23 NOTE — PLAN OF CARE
Plan of care reviewed with patient; verbalized understanding.   Medications reviewed and administered as ordered.  Rounding for safety and patient care per policy.   Safety precautions maintained.   Call light within reach, bed wheels locked, bed in lowest position, side rails ^x2, safety maintained. NADN, Will continue monitor.        Problem: Adult Inpatient Plan of Care  Goal: Plan of Care Review  Flowsheets (Taken 11/23/2019 0416)  Plan of Care Reviewed With: patient     Problem: Fall Injury Risk  Goal: Absence of Fall and Fall-Related Injury  Intervention: Promote Injury-Free Environment  Flowsheets (Taken 11/23/2019 0416)  Safety Promotion/Fall Prevention: assistive device/personal item within reach; commode/urinal/bedpan at bedside; lighting adjusted; medications reviewed; nonskid shoes/socks when out of bed; side rails raised x 2; toileting scheduled; instructed to call staff for mobility  Environmental Safety Modification: assistive device/personal items within reach; clutter free environment maintained; lighting adjusted

## 2019-11-23 NOTE — PROGRESS NOTES
Discharge Note: Pt  Discharged home alert and oriented x4, skin warn to touch, Right hip dressing clean, dry and intact, + peripheral pulses, denies any numbness or tingling. Pt verbalized understanding of discharge teaching.

## 2019-11-23 NOTE — PLAN OF CARE
[facility]    HOME HEALTH ORDERS  FACE TO FACE ENCOUNTER    Patient Name: Michael Palacios  YOB: 1995    PCP: Primary Doctor No   PCP Address: None  PCP Phone Number: None  PCP Fax: None    Encounter Date: 11/23/2019    Admit to Home Health    Diagnoses:  Active Hospital Problems    Diagnosis  POA    *Closed displaced associated transverse-posterior fracture of right acetabulum [S32.463X]  Yes    Dislocation of hip, posterior, right, closed [S73.014A]  Yes      Resolved Hospital Problems   No resolved problems to display.       No future appointments.        I have seen and examined this patient face to face today. My clinical findings that support the need for the home health skilled services and home bound status are the following:  Weakness/numbness causing balance and gait disturbance due to Fracture and Surgery making it taxing to leave home.    Allergies:Review of patient's allergies indicates:  No Known Allergies    Diet: regular diet    Activities: activity as tolerated    Nursing:   SN to complete comprehensive assessment including routine vital signs. Instruct on disease process and s/s of complications to report to MD. Follow specific home health arthoplasty protocol. Review/verify medication list sent home with the patient at time of discharge  and instruct patient/caregiver as needed. If coumadin ordered, coumadin clinic to manage INR with INR draws 2x per week with a goal to maintain INR between 1.8 and 2.2. Frequency may be adjusted depending on start of care date.    Notify MD if SBP > 160 or < 90; DBP > 90 or < 50; HR > 120 or < 50; Temp > 101    Home Medical Equipment:  Walker, 3-1 bedside commode, transfer tub bench    CONSULTS:    Physical Therapy to evaluate and treat. Evaluate for home safety and equipment needs; Establish/upgrade home exercise program. Perform / instruct on therapeutic exercises, gait training, transfer training, and Range of  Motion.    OTHER:  Occupational Therapy to evaluate and treat. Evaluate home environment for safety and equipment needs. Perform/Instruct on transfers, ADL training, ROM, and therapeutic exercises.    MISCELLANEOUS CARE:  Routine Skin for Bedridden Patients: Instruct patient/caregiver to apply moisture barrier cream to all skin folds and wet areas in perineal area daily and after baths and all bowel movements.    WOUND CARE ORDERS  Do not remove surgical dressing for 2 weeks post-op. This will be done only by MD at initial post-op visit. If dressing is completely saturated, replace with identical dressing - silver-impregnated hydrocolloid dressing.     Do not get dressings wet. Do not shower.     If dressing continues to be saturated or there are signs of infection, please call [facility] [phone] for further instructions and to make appt to be seen.         Medications: Review discharge medications with patient and family and provide education.      Current Discharge Medication List      START taking these medications    Details   aspirin (ECOTRIN) 81 MG EC tablet Take 1 tablet (81 mg total) by mouth 2 (two) times daily.  Qty: 82 tablet, Refills: 0      docusate sodium (COLACE) 100 MG capsule Take 1 capsule (100 mg total) by mouth 2 (two) times daily.  Qty: 60 capsule, Refills: 0      oxyCODONE-acetaminophen (PERCOCET)  mg per tablet Take 1 tablet by mouth every 4 (four) hours as needed for Pain.  Qty: 45 tablet, Refills: 0    Comments: Quantity prescribed more than 7 day supply? Yes, quantity medically necessary         CONTINUE these medications which have CHANGED    Details   ondansetron (ZOFRAN) 4 MG tablet Take 1 tablet (4 mg total) by mouth every 6 (six) hours as needed for Nausea.  Qty: 12 tablet, Refills: 0         STOP taking these medications       acyclovir (ZOVIRAX) 400 MG tablet Comments:   Reason for Stopping:         sulfamethoxazole-trimethoprim 800-160mg (BACTRIM DS) 800-160 mg Tab  Comments:   Reason for Stopping:               I certify that this patient is confined to his home and needs physical therapy and occupational therapy.

## 2019-11-23 NOTE — PLAN OF CARE
Pt agreeable to therapy and tolerated session well. Pt. Required Min A for RLE in supine<>sit<>supine. Pt. Ambulated ~30ft. CGA using RW and maintaining TTWB precautions. Pt. Stood at sink SBA to complete grooming tasks. Pt. Able to recall 3/3 hip precautions. Pt demonstrated ability to complete LB dressing using sock aide and reacher. Pt is safe to d/c home from an OT standpoint with OP PT.       Problem: Occupational Therapy Goal  Goal: Occupational Therapy Goal  Description  Goals to be met by: 11/29/2019    Patient will increase functional independence with ADLs by performing:    UE Dressing with [place].  LE Dressing with Supervision with AD.   Grooming while standing at sink with Supervision.  Toileting from toilet with Supervision for hygiene and clothing management.   Stand pivot transfers with Supervision and AD.  Toilet transfer to toilet with Supervision and AD.  Pt will independently recall 3/3 posterior hip precautions        Outcome: Ongoing, Progressing

## 2019-11-23 NOTE — ASSESSMENT & PLAN NOTE
Michael Palacios is a 24 y.o. male with no significant past medical history who presents with a right posterior wall ministerio transverse acetabular fracture/dislocation. S/p ORIF 11/21.    - Antibiotics: post-op  - Weight bearing status: TTWB  - Labs: Hgb 11.7  - DVT Prophylaxis: mechanical, lovenox   - Lines/Drains: PIV  - Pain control: multimodal    Dispo: pending progress with PT

## 2019-11-23 NOTE — SUBJECTIVE & OBJECTIVE
"Principal Problem:Closed displaced associated transverse-posterior fracture of right acetabulum    Principal Orthopedic Problem: same    Interval History: Pt seen and examined at bedside. SANTIAGO. Worked with  PT yesterday. Pain controlled.      Review of patient's allergies indicates:  No Known Allergies       Current Facility-Administered Medications   Medication    0.9%  NaCl infusion (for blood administration)    acetaminophen tablet 650 mg    celecoxib capsule 200 mg    enoxaparin injection 40 mg    HYDROmorphone injection 0.5 mg    lidocaine (PF) 10 mg/ml (1%) injection 10 mg    melatonin tablet 6 mg    methocarbamol tablet 750 mg    mupirocin 2 % ointment 1 g    ondansetron disintegrating tablet 8 mg    ondansetron injection 4 mg    oxyCODONE immediate release tablet 10 mg    oxyCODONE immediate release tablet 10 mg    oxyCODONE immediate release tablet 15 mg    oxyCODONE immediate release tablet 5 mg    oxyCODONE immediate release tablet 5 mg    senna-docusate 8.6-50 mg per tablet 1 tablet    sodium chloride 0.9% flush 10 mL    sodium chloride 0.9% flush 10 mL     Objective:     Vital Signs (Most Recent):  Temp: 97 °F (36.1 °C) (11/23/19 0717)  Pulse: 75 (11/23/19 0717)  Resp: 18 (11/23/19 0717)  BP: 120/76 (11/23/19 0717)  SpO2: 97 % (11/23/19 0717) Vital Signs (24h Range):  Temp:  [97 °F (36.1 °C)-98.3 °F (36.8 °C)] 97 °F (36.1 °C)  Pulse:  [73-88] 75  Resp:  [18] 18  SpO2:  [96 %-98 %] 97 %  BP: (113-124)/(57-76) 120/76     Weight: 77.6 kg (171 lb)  Height: 5' 9" (175.3 cm)  Body mass index is 25.25 kg/m².      Intake/Output Summary (Last 24 hours) at 11/23/2019 0758  Last data filed at 11/22/2019 1200  Gross per 24 hour   Intake 500 ml   Output --   Net 500 ml       Ortho/SPM Exam     HEENT: normocephalic, atraumatic  Resp: no increased work of breathing  CV: regular rate and rhythm  MSK: moves b/l upper extremities well    right lower extremity:  Aquacel dressing c/d/i  Femoral traction " pin dressings clean/dry  Sensation intact SP/DP/T/Sural/Saphenous nerves  Motor intact EHL/FHL/TA/gastroc  Palpable DP/PT pulses      Significant Labs:   CBC:   Recent Labs   Lab 11/21/19  1353 11/22/19  0458 11/23/19  0546   WBC 17.74*  17.74* 9.93 7.34   HGB 13.7*  13.7* 11.7* 11.7*   HCT 42.8  42.8 36.8* 35.7*     292 212 215     CMP:   Recent Labs   Lab 11/21/19  1353      K 4.0      CO2 20*   *   BUN 13   CREATININE 1.1   CALCIUM 8.5*   ANIONGAP 14   EGFRNONAA >60.0       Significant Imaging: I have reviewed all pertinent imaging results/findings.

## 2019-11-23 NOTE — ANESTHESIA POSTPROCEDURE EVALUATION
Anesthesia Post Evaluation    Patient: Michael P Suzanne    Procedure(s) Performed: Procedure(s) (LRB):  ORIF, FRACTURE, ACETABULUM - right - prone - pro fx (Right)    Final Anesthesia Type: general and regional    Patient location during evaluation: floor  Patient participation: Yes- Able to Participate  Level of consciousness: awake and alert  Post-procedure vital signs: reviewed and stable  Pain management: adequate  Airway patency: patent    PONV status at discharge: No PONV  Anesthetic complications: no      Cardiovascular status: hemodynamically stable  Respiratory status: unassisted, spontaneous ventilation and room air  Hydration status: euvolemic  Follow-up not needed.          Vitals Value Taken Time   /75 11/23/2019  4:00 PM   Temp 36.6 °C (97.9 °F) 11/23/2019  4:00 PM   Pulse 76 11/23/2019  4:00 PM   Resp 14 11/23/2019  4:00 PM   SpO2 96 % 11/23/2019  4:00 PM         Event Time     Out of Recovery 11/21/2019 15:24:56          Pain/Sunita Score: Pain Rating Prior to Med Admin: 8 (11/23/2019 11:54 AM)  Pain Rating Post Med Admin: 2 (11/23/2019  9:00 AM)

## 2019-11-23 NOTE — PLAN OF CARE
Goals remain appropriate.     Problem: Physical Therapy Goal  Goal: Physical Therapy Goal  Description  Goals to be met by: 2019    Patient will increase functional independence with mobility by performin. Supine to sit with [place]  2. Sit to supine with [place]  3. Sit to stand transfer with [place]  4. Bed to chair transfer with Modified [place] using Rolling Walker  5. Gait  x 100 feet with Modified [place] using Rolling Walker.   6. Ascend/descend 1 stair with modified independence using Rolling Walker     Outcome: Ongoing, Progressing

## 2019-11-23 NOTE — PROGRESS NOTES
"[facility]  Orthopedics  Progress Note    Patient Name: Michael Palacios  MRN: [id number]  Admission Date: 11/20/2019  Hospital Length of Stay: 3 days  Attending Provider: Morro Michelle,*  Primary Care Provider: Primary Doctor No  Follow-up For: Procedure(s) (LRB):  ORIF, FRACTURE, ACETABULUM - right - prone - pro fx (Right)    Post-Operative Day: 2 Days Post-Op  Subjective:     Principal Problem:Closed displaced associated transverse-posterior fracture of right acetabulum    Principal Orthopedic Problem: same    Interval History: Pt seen and examined at bedside. SANTIAGO. Worked with  PT yesterday. Pain controlled.      Review of patient's allergies indicates:  No Known Allergies       Current Facility-Administered Medications   Medication    0.9%  NaCl infusion (for blood administration)    acetaminophen tablet 650 mg    celecoxib capsule 200 mg    enoxaparin injection 40 mg    HYDROmorphone injection 0.5 mg    lidocaine (PF) 10 mg/ml (1%) injection 10 mg    melatonin tablet 6 mg    methocarbamol tablet 750 mg    mupirocin 2 % ointment 1 g    ondansetron disintegrating tablet 8 mg    ondansetron injection 4 mg    oxyCODONE immediate release tablet 10 mg    oxyCODONE immediate release tablet 10 mg    oxyCODONE immediate release tablet 15 mg    oxyCODONE immediate release tablet 5 mg    oxyCODONE immediate release tablet 5 mg    senna-docusate 8.6-50 mg per tablet 1 tablet    sodium chloride 0.9% flush 10 mL    sodium chloride 0.9% flush 10 mL     Objective:     Vital Signs (Most Recent):  Temp: 97 °F (36.1 °C) (11/23/19 0717)  Pulse: 75 (11/23/19 0717)  Resp: 18 (11/23/19 0717)  BP: 120/76 (11/23/19 0717)  SpO2: 97 % (11/23/19 0717) Vital Signs (24h Range):  Temp:  [97 °F (36.1 °C)-98.3 °F (36.8 °C)] 97 °F (36.1 °C)  Pulse:  [73-88] 75  Resp:  [18] 18  SpO2:  [96 %-98 %] 97 %  BP: (113-124)/(57-76) 120/76     Weight: 77.6 kg (171 lb)  Height: 5' 9" (175.3 cm)  Body mass index " is 25.25 kg/m².      Intake/Output Summary (Last 24 hours) at 11/23/2019 0758  Last data filed at 11/22/2019 1200  Gross per 24 hour   Intake 500 ml   Output --   Net 500 ml       Ortho/SPM Exam     HEENT: normocephalic, atraumatic  Resp: no increased work of breathing  CV: regular rate and rhythm  MSK: moves b/l upper extremities well    right lower extremity:  Aquacel dressing c/d/i  Femoral traction pin dressings clean/dry  Sensation intact SP/DP/T/Sural/Saphenous nerves  Motor intact EHL/FHL/TA/gastroc  Palpable DP/PT pulses      Significant Labs:   CBC:   Recent Labs   Lab 11/21/19  1353 11/22/19  0458 11/23/19  0546   WBC 17.74*  17.74* 9.93 7.34   HGB 13.7*  13.7* 11.7* 11.7*   HCT 42.8  42.8 36.8* 35.7*     292 212 215     CMP:   Recent Labs   Lab 11/21/19  1353      K 4.0      CO2 20*   *   BUN 13   CREATININE 1.1   CALCIUM 8.5*   ANIONGAP 14   EGFRNONAA >60.0       Significant Imaging: I have reviewed all pertinent imaging results/findings.    Assessment/Plan:     * Closed displaced associated transverse-posterior fracture of right acetabulum  Michael Palacios is a 24 y.o. male with no significant past medical history who presents with a right posterior wall ministerio transverse acetabular fracture/dislocation. S/p ORIF 11/21.    - Antibiotics: post-op  - Weight bearing status: TTWB  - Labs: Hgb 11.7  - DVT Prophylaxis: mechanical, lovenox   - Lines/Drains: PIV  - Pain control: multimodal    Dispo: pending progress with PT            Andi Reyna MD  Orthopedics  [facility]

## 2019-11-23 NOTE — PT/OT/SLP PROGRESS
Occupational Therapy   Treatment    Name: Michael Palacios  MRN: [id number]  Admitting Diagnosis:  Closed displaced associated transverse-posterior fracture of right acetabulum  2 Days Post-Op    Recommendations:     Discharge Recommendations: home health PT  Discharge Equipment Recommendations:  bedside commode  Barriers to discharge:  None    Assessment:     Michael Palacios is a 24 y.o. male with a medical diagnosis of Closed displaced associated transverse-posterior fracture of right acetabulum.  He presents with the following Performance deficits affecting function are weakness, impaired endurance, impaired self care skills, visual deficits, impaired functional mobilty, impaired cognition, impaired balance, decreased lower extremity function, pain, decreased safety awareness.     Pt agreeable to therapy and tolerated session well. Pt. Required Min A for RLE in supine<>sit<>supine. Pt. Ambulated ~30ft. CGA using RW and maintaining TTWB precautions. Pt. Stood at sink SBA to complete grooming tasks. Pt. Able to recall 3/3 hip precautions. Pt demonstrated ability to complete LB dressing using sock aide and reacher. Pt is safe to d/c home from an OT standpoint with OP PT.     Rehab Prognosis:  Good; patient would benefit from acute skilled OT services to address these deficits and reach maximum level of function.       Plan:     Patient to be seen daily to address the above listed problems via self-care/home management, therapeutic activities, therapeutic exercises  · Plan of Care Expires: 12/21/19  · Plan of Care Reviewed with: patient, family    Subjective     Pain/Comfort:  · Pain Rating 1: 5/10  · Location - Side 1: Right  · Location 1: leg  · Pain Addressed 1: Reposition, Distraction    Objective:     Communicated with: RN prior to session.  Patient found supine with FCD upon OT entry to room.    General Precautions: Standard, fall   Orthopedic Precautions:RLE toe touch weight bearing, RLE posterior precautions    Braces:       Occupational Performance:     Bed Mobility:    · Patient completed Supine to Sit with minimum assistance  · Patient completed Sit to Supine with minimum assistance     Functional Mobility/Transfers:  · Patient completed Sit <> Stand Transfer with contact guard assistance  with  rolling walker   · Functional Mobility:  Pt. Required Min A for RLE in supine<>sit<>supine. Pt. Ambulated ~30ft. CGA using RW and maintaining TTWB precautions. Pt. Stood at sink SBA to complete grooming tasks. Pt. Able to recall 3/3 hip precautions. Pt demonstrated ability to complete LB dressing using sock aide and reacher. Pt is safe to d/c home from an OT standpoint with OP PT.     Activities of Daily Living:  · Grooming: stand by assistance standing at sink  · Lower Body Dressing: minimum assistance mirta socks and underwear using hip kit      [facility] 6 Click ADL: 20    Treatment & Education:  - OT/POC-  - Importance of mobility to maximize recovery.  - Educated pt. On Posterior hip precautions - pt. Recalled all hip precautions  - safety w/ functional mobility; hand placement to ensure safe transfers to various surfaces in prep for ADLs  - Reviewed gait sequence and RW management via verbalization and demonstration   - Educated on performing LBD utilizing AD to facilitate task and adhere to all precautions  - encouraged to ambulate within household environment at least every hour to hour 1/2      Patient left supine with all lines intact, call button in reach, RN notified and friend presentEducation:      GOALS:   Multidisciplinary Problems     Occupational Therapy Goals        Problem: Occupational Therapy Goal    Goal Priority Disciplines Outcome Interventions   Occupational Therapy Goal     OT, PT/OT Ongoing, Progressing    Description:  Goals to be met by: 11/29/2019    Patient will increase functional independence with ADLs by performing:    UE Dressing with [place].  LE Dressing with Supervision with AD.    Grooming while standing at sink with Supervision.  Toileting from toilet with Supervision for hygiene and clothing management.   Stand pivot transfers with Supervision and AD.  Toilet transfer to toilet with Supervision and AD.  Pt will independently recall 3/3 posterior hip precautions                         Time Tracking:     OT Date of Treatment: 11/23/19  OT Start Time: 1053  OT Stop Time: 1114  OT Total Time (min): 21 min    Billable Minutes:Therapeutic Activity 21    Sarah Rosen OT  11/23/2019

## 2019-11-23 NOTE — PT/OT/SLP PROGRESS
Physical Therapy Treatment    Patient Name:  Michael Palacios   MRN:  [id number]    Recommendations:     Discharge Recommendations:  home health PT   Discharge Equipment Recommendations: bedside commode, bath bench   Barriers to discharge: None    Assessment:     Michael Palacios is a 24 y.o. male admitted with a medical diagnosis of Closed displaced associated transverse-posterior fracture of right acetabulum.  He presents with the following impairments/functional limitations:  weakness, impaired endurance, impaired self care skills, impaired functional mobilty, gait instability, impaired balance, decreased lower extremity function, pain, orthopedic precautions, decreased ROM. Pt tolerated session well with focus on bed mobility, transfers, and gait/stair training. Pt progressing well performing OOB mobility with SBA and requiring Min A for RLE mgmt with bed mobility. Pt educated and participates in stair training to ascend/descend on buttocks with pt able to maintain hip precautions. Pt unable to safely manage RLE to hop up to first stair this day. Pt will continue to benefit from therapy services to address impairments listed above.     Rehab Prognosis: Good; patient would benefit from acute skilled PT services to address these deficits and reach maximum level of function.    Recent Surgery: Procedure(s) (LRB):  ORIF, FRACTURE, ACETABULUM - right - prone - pro fx (Right) 2 Days Post-Op    Plan:     During this hospitalization, patient to be seen daily to address the identified rehab impairments via gait training, therapeutic activities, therapeutic exercises, neuromuscular re-education and progress toward the following goals:    · Plan of Care Expires:  12/22/19    Subjective     Chief Complaint: pain and difficulty with bed mobility  Pain/Comfort:  · Pain Rating 1: 4/10  · Location - Side 1: Right  · Location - Orientation 1: generalized  · Location 1: leg  · Pain Addressed 1: Reposition, Distraction  · Pain Rating  Post-Intervention 1: 4/10      Objective:     Communicated with NSG prior to session.  Patient found supine with FCD(hip adduction pillow) upon PTA entry to room.     General Precautions: Standard, fall   Orthopedic Precautions:RLE toe touch weight bearing, RLE posterior precautions   Braces: N/A     Functional Mobility:  · Bed Mobility:     · Supine to Sit: contact guard assistance  · Sit to Supine: minimum assistance for RLE mgmt  · Transfers:     · Sit to Stand:  stand by assistance with rolling walker  · Gait: Pt ambulates 60 ft x 2 trials with RW and SBA. Pt maintains TTWB appropriately.   · Stairs:  Pt ascended/descended 6 stair(s) with No Assistive Device with left handrail with Moderate Assistance. Pt asc/dec on buttocks with sitting stair transfer. Assistance needed to transfer to/from sitting on steps.       AM-PAC 6 CLICK MOBILITY  Turning over in bed (including adjusting bedclothes, sheets and blankets)?: 3  Sitting down on and standing up from a chair with arms (e.g., wheelchair, bedside commode, etc.): 3  Moving from lying on back to sitting on the side of the bed?: 3  Moving to and from a bed to a chair (including a wheelchair)?: 3  Need to walk in hospital room?: 3  Climbing 3-5 steps with a railing?: 2  Basic Mobility Total Score: 17       Therapeutic Activities and Exercises:   Pt assisted with functional mobility as noted above.   Pt educated on posterior hip precautions and weight bearing status.   Pt educated on safety and technique with bed and OOB mobility.   Pt educated on DAVID therex exercises.     Patient left supine with all lines intact and call button in reach.    GOALS:   Multidisciplinary Problems     Physical Therapy Goals        Problem: Physical Therapy Goal    Goal Priority Disciplines Outcome Goal Variances Interventions   Physical Therapy Goal     PT, PT/OT Ongoing, Progressing     Description:  Goals to be met by: 12/6/2019    Patient will increase functional independence with  mobility by performin. Supine to sit with [place]  2. Sit to supine with [place]  3. Sit to stand transfer with [place]  4. Bed to chair transfer with Modified [place] using Rolling Walker  5. Gait  x 100 feet with Modified [place] using Rolling Walker.   6. Ascend/descend 1 stair with modified independence using Rolling Walker                      Time Tracking:     PT Received On: 19  PT Start Time: 0819     PT Stop Time: 0850  PT Total Time (min): 31 min     Billable Minutes: Gait Training 21 and Therapeutic Activity 10    Treatment Type: Treatment  PT/PTA: PTA     PTA Visit Number: 1     Jarek Villatoro, PTA  2019
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
## 2019-11-24 LAB
BLD PROD TYP BPU: NORMAL
BLD PROD TYP BPU: NORMAL
BLOOD UNIT EXPIRATION DATE: NORMAL
BLOOD UNIT EXPIRATION DATE: NORMAL
BLOOD UNIT TYPE CODE: 7300
BLOOD UNIT TYPE CODE: 7300
BLOOD UNIT TYPE: NORMAL
BLOOD UNIT TYPE: NORMAL
CODING SYSTEM: NORMAL
CODING SYSTEM: NORMAL
DISPENSE STATUS: NORMAL
DISPENSE STATUS: NORMAL
TRANS ERYTHROCYTES VOL PATIENT: NORMAL ML
TRANS ERYTHROCYTES VOL PATIENT: NORMAL ML

## 2019-11-25 ENCOUNTER — TELEPHONE (OUTPATIENT)
Dept: ORTHOPEDICS | Facility: CLINIC | Age: 24
End: 2019-11-25

## 2019-11-25 NOTE — TELEPHONE ENCOUNTER
Called and Informed patient of appointment on 12/4/19 at 2:30 pm and mailed.  Patient states verbal understanding and has no further questions.

## 2019-11-26 ENCOUNTER — TELEPHONE (OUTPATIENT)
Dept: ORTHOPEDICS | Facility: CLINIC | Age: 24
End: 2019-11-26

## 2019-11-26 NOTE — TELEPHONE ENCOUNTER
Spoke with pt's dad, Ladarius.   Faxed demographics and home health orders to [facility] [phone]

## 2019-11-26 NOTE — TELEPHONE ENCOUNTER
----- Message from Moises Moralez sent at 11/25/2019  3:59 PM CST -----  Contact: self  Pt states that Medicaid denied the home health orders Pt ask to send the orders to [facility] that is on file Pt ask for a call    Contact info  [phone]

## 2019-11-26 NOTE — TELEPHONE ENCOUNTER
----- Message from Elisabeth Saenz sent at 11/26/2019  9:40 AM CST -----  Contact: Pt's dad  Pt's dad is needing a call back to give the staff a list of home health agency places that accept his insurance for the pt to receive home health care.     Pt's dad can be reached @ [phone]

## 2019-11-26 NOTE — TELEPHONE ENCOUNTER
Notified pt that he need to call [facility] with his insurance information [employer], to cover his home health visits.  I explain to pt that I called and spoke with Zoey with [facility] who stated that, they do not accept that type of BCBS insurance.   Pt stated that he will call [facility]. Patient states verbal understanding and has no further questions.

## 2019-11-26 NOTE — TELEPHONE ENCOUNTER
----- Message from Jaydon Butts sent at 11/26/2019  8:44 AM CST -----  Contact: pt father/Ladarius Tellez Aubrie    Please call pt father at [phone]     Patient father is returning your call regarding home health    Thank you

## 2019-11-26 NOTE — TELEPHONE ENCOUNTER
Notified pt father Ladarius; regarding pt home health. Pt need to call the number on the back of his insurance card; about coverage for home health. Pt father Ladarius will call back with information. Patient father Ladarius states verbal understanding and has no further questions.

## 2019-11-27 ENCOUNTER — TELEPHONE (OUTPATIENT)
Dept: ORTHOPEDICS | Facility: CLINIC | Age: 24
End: 2019-11-27

## 2019-11-27 NOTE — TELEPHONE ENCOUNTER
----- Message from Mari Singer sent at 11/27/2019 10:09 AM CST -----  Contact: Patient  Patient is calling in regards to needing advice for home health.    Phone: [phone]

## 2019-11-29 ENCOUNTER — TELEPHONE (OUTPATIENT)
Dept: ORTHOPEDICS | Facility: CLINIC | Age: 24
End: 2019-11-29

## 2019-12-01 NOTE — PLAN OF CARE
Pt d/c'ed to home w/ [facility].     12/01/19 1053   Final Note   Assessment Type Final Discharge Note   Anticipated Discharge Disposition Home-Health   Right Care Referral Info   Post Acute Recommendation Home-care   Facility Name [facility]

## 2019-12-04 ENCOUNTER — OFFICE VISIT (OUTPATIENT)
Dept: ORTHOPEDICS | Facility: CLINIC | Age: 24
End: 2019-12-04
Payer: COMMERCIAL

## 2019-12-04 ENCOUNTER — HOSPITAL ENCOUNTER (OUTPATIENT)
Dept: RADIOLOGY | Facility: HOSPITAL | Age: 24
Discharge: HOME | End: 2019-12-04
Attending: ORTHOPAEDIC SURGERY
Payer: COMMERCIAL

## 2019-12-04 VITALS
HEART RATE: 95 BPM | SYSTOLIC BLOOD PRESSURE: 129 MMHG | BODY MASS INDEX: 25.18 KG/M2 | TEMPERATURE: 98 F | DIASTOLIC BLOOD PRESSURE: 78 MMHG | HEIGHT: 69 IN | WEIGHT: 170 LBS

## 2019-12-04 DIAGNOSIS — S73.014D CLOSED POSTERIOR DISLOCATION OF RIGHT HIP, SUBSEQUENT ENCOUNTER: ICD-10-CM

## 2019-12-04 DIAGNOSIS — M25.559 ARTHRALGIA OF HIP, UNSPECIFIED LATERALITY: Primary | ICD-10-CM

## 2019-12-04 DIAGNOSIS — M25.559 ARTHRALGIA OF HIP, UNSPECIFIED LATERALITY: ICD-10-CM

## 2019-12-04 DIAGNOSIS — S32.461D CLOSED DISPLACED COMBINED TRANSVERSE-POSTERIOR FRACTURE OF RIGHT ACETABULUM WITH ROUTINE HEALING, SUBSEQUENT ENCOUNTER: Primary | ICD-10-CM

## 2019-12-04 PROCEDURE — 99024 PR POST-OP FOLLOW-UP VISIT: ICD-10-PCS | Mod: S$GLB,,, | Performed by: ORTHOPAEDIC SURGERY

## 2019-12-04 PROCEDURE — 72170 X-RAY EXAM OF PELVIS: CPT | Mod: 26,,, | Performed by: RADIOLOGY

## 2019-12-04 PROCEDURE — 99999 PR PBB SHADOW E&M-EST. PATIENT-LVL III: CPT | Mod: PBBFAC,,, | Performed by: ORTHOPAEDIC SURGERY

## 2019-12-04 PROCEDURE — 99999 PR PBB SHADOW E&M-EST. PATIENT-LVL III: ICD-10-PCS | Mod: PBBFAC,,, | Performed by: ORTHOPAEDIC SURGERY

## 2019-12-04 PROCEDURE — 99024 POSTOP FOLLOW-UP VISIT: CPT | Mod: S$GLB,,, | Performed by: ORTHOPAEDIC SURGERY

## 2019-12-04 PROCEDURE — 72170 XR PELVIS JUDET VIEWS: ICD-10-PCS | Mod: 26,,, | Performed by: RADIOLOGY

## 2019-12-04 PROCEDURE — 72170 X-RAY EXAM OF PELVIS: CPT | Mod: TC
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
## 2019-12-10 LAB
FINAL PATHOLOGIC DIAGNOSIS: NORMAL
GROSS: NORMAL

## 2020-01-09 ENCOUNTER — HOSPITAL ENCOUNTER (OUTPATIENT)
Dept: RADIOLOGY | Facility: HOSPITAL | Age: 25
Discharge: HOME | End: 2020-01-09
Attending: ORTHOPAEDIC SURGERY
Payer: COMMERCIAL

## 2020-01-09 ENCOUNTER — OFFICE VISIT (OUTPATIENT)
Dept: ORTHOPEDICS | Facility: CLINIC | Age: 25
End: 2020-01-09
Payer: COMMERCIAL

## 2020-01-09 DIAGNOSIS — S32.461D CLOSED DISPLACED COMBINED TRANSVERSE-POSTERIOR FRACTURE OF RIGHT ACETABULUM WITH ROUTINE HEALING, SUBSEQUENT ENCOUNTER: ICD-10-CM

## 2020-01-09 DIAGNOSIS — S32.461D CLOSED DISPLACED COMBINED TRANSVERSE-POSTERIOR FRACTURE OF RIGHT ACETABULUM WITH ROUTINE HEALING, SUBSEQUENT ENCOUNTER: Primary | ICD-10-CM

## 2020-01-09 PROCEDURE — 99999 PR PBB SHADOW E&M-EST. PATIENT-LVL II: ICD-10-PCS | Mod: PBBFAC,,, | Performed by: ORTHOPAEDIC SURGERY

## 2020-01-09 PROCEDURE — 99024 POSTOP FOLLOW-UP VISIT: CPT | Mod: S$GLB,,, | Performed by: ORTHOPAEDIC SURGERY

## 2020-01-09 PROCEDURE — 72170 X-RAY EXAM OF PELVIS: CPT | Mod: TC

## 2020-01-09 PROCEDURE — 99024 PR POST-OP FOLLOW-UP VISIT: ICD-10-PCS | Mod: S$GLB,,, | Performed by: ORTHOPAEDIC SURGERY

## 2020-01-09 PROCEDURE — 99999 PR PBB SHADOW E&M-EST. PATIENT-LVL II: CPT | Mod: PBBFAC,,, | Performed by: ORTHOPAEDIC SURGERY

## 2020-01-09 PROCEDURE — 72170 XR PELVIS JUDET VIEWS: ICD-10-PCS | Mod: 26,,, | Performed by: RADIOLOGY

## 2020-01-09 PROCEDURE — 72170 X-RAY EXAM OF PELVIS: CPT | Mod: 26,,, | Performed by: RADIOLOGY
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
## 2020-02-20 ENCOUNTER — HOSPITAL ENCOUNTER (OUTPATIENT)
Dept: RADIOLOGY | Facility: HOSPITAL | Age: 25
Discharge: HOME | End: 2020-02-20
Attending: NURSE PRACTITIONER
Payer: COMMERCIAL

## 2020-02-20 ENCOUNTER — OFFICE VISIT (OUTPATIENT)
Dept: ORTHOPEDICS | Facility: CLINIC | Age: 25
End: 2020-02-20
Payer: COMMERCIAL

## 2020-02-20 VITALS
HEART RATE: 82 BPM | WEIGHT: 170 LBS | HEIGHT: 69 IN | BODY MASS INDEX: 25.18 KG/M2 | DIASTOLIC BLOOD PRESSURE: 75 MMHG | SYSTOLIC BLOOD PRESSURE: 114 MMHG

## 2020-02-20 DIAGNOSIS — M25.552 PAIN OF BOTH HIP JOINTS: ICD-10-CM

## 2020-02-20 DIAGNOSIS — S32.461D CLOSED DISPLACED COMBINED TRANSVERSE-POSTERIOR FRACTURE OF RIGHT ACETABULUM WITH ROUTINE HEALING, SUBSEQUENT ENCOUNTER: ICD-10-CM

## 2020-02-20 DIAGNOSIS — M25.551 PAIN OF BOTH HIP JOINTS: ICD-10-CM

## 2020-02-20 DIAGNOSIS — Z98.890 POST-OPERATIVE STATE: Primary | ICD-10-CM

## 2020-02-20 DIAGNOSIS — M25.552 PAIN OF BOTH HIP JOINTS: Primary | ICD-10-CM

## 2020-02-20 DIAGNOSIS — M25.551 PAIN OF BOTH HIP JOINTS: Primary | ICD-10-CM

## 2020-02-20 PROCEDURE — 99024 PR POST-OP FOLLOW-UP VISIT: ICD-10-PCS | Mod: S$GLB,,, | Performed by: NURSE PRACTITIONER

## 2020-02-20 PROCEDURE — 72170 X-RAY EXAM OF PELVIS: CPT | Mod: TC

## 2020-02-20 PROCEDURE — 72170 X-RAY EXAM OF PELVIS: CPT | Mod: 26,,, | Performed by: RADIOLOGY

## 2020-02-20 PROCEDURE — 72170 XR PELVIS JUDET VIEWS: ICD-10-PCS | Mod: 26,,, | Performed by: RADIOLOGY

## 2020-02-20 PROCEDURE — 99024 POSTOP FOLLOW-UP VISIT: CPT | Mod: S$GLB,,, | Performed by: NURSE PRACTITIONER

## 2020-02-20 PROCEDURE — 99999 PR PBB SHADOW E&M-EST. PATIENT-LVL III: ICD-10-PCS | Mod: PBBFAC,,, | Performed by: NURSE PRACTITIONER

## 2020-02-20 PROCEDURE — 99999 PR PBB SHADOW E&M-EST. PATIENT-LVL III: CPT | Mod: PBBFAC,,, | Performed by: NURSE PRACTITIONER
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
## 2020-03-03 ENCOUNTER — CLINICAL SUPPORT (OUTPATIENT)
Dept: REHABILITATION | Facility: HOSPITAL | Age: 25
End: 2020-03-03
Payer: MEDICAID

## 2020-03-03 DIAGNOSIS — S32.461D CLOSED DISPLACED COMBINED TRANSVERSE-POSTERIOR FRACTURE OF RIGHT ACETABULUM WITH ROUTINE HEALING, SUBSEQUENT ENCOUNTER: ICD-10-CM

## 2020-03-03 DIAGNOSIS — R53.1 WEAKNESS: ICD-10-CM

## 2020-03-03 PROCEDURE — 97110 THERAPEUTIC EXERCISES: CPT | Performed by: PHYSICAL THERAPIST

## 2020-03-03 PROCEDURE — 97161 PT EVAL LOW COMPLEX 20 MIN: CPT | Performed by: PHYSICAL THERAPIST

## 2020-03-03 PROCEDURE — 97140 MANUAL THERAPY 1/> REGIONS: CPT | Performed by: PHYSICAL THERAPIST

## 2020-03-03 NOTE — PATIENT INSTRUCTIONS
Strengthening: Quadriceps Set    Tighten muscles on top of thighs by pushing knees down into surface. Hold 5 seconds.  Repeat 25 times . R/L or BL  leg per set. Do 1 sets per session. Do 2 sessions per day.      Straight Leg Raise     With R/L or BL leg straight, other leg bent, raise straight leg until knees are even. Slowly lower. Roll on your side and repeat lifting top leg up, on other side, lifting bottom leg up, and on stomach kicking back (squeezing your rear end before you kick back).  Repeat 25 times. Do 1 sets per session. Do 2 sessions per day.       Glute Squeeze, supine    Lie face up and squeeze your rear end. Do not tighten your abdominals or hold your breath. Squeeze our glutes and hold 5 seconds. Relax.  Repeat 25 times per set. Do 1 sets per session. Do 2 sessions per day.      Strengthening: Hip Adduction - Isometric    Sit back or lie down with ball or folded pillow between knees, and squeeze knees together. Hold 5 seconds.  Repeat 25 times per set. Do 1 sets per session. Do 2 sessions per day.        Strengthening: Hip Abductor - Resisted    Lie flat with band looped around both legs above knees, and push thighs apart, ensuring right and left move together.  Repeat 25 times per set. Do 1 sets per session. Do 2 sessions per day.      Clam Shells    Lie on side with knees bent. Raise top leg, keeping knee bent and ankles together. Do not let your hips roll back as your raise your leg.  Repeat 25 times. Do 1 sets per session. Do 2 sessions per day.      Hamstring Stretch    Perform 5 times, 30 sec hold 2x day - (HEP to go)      Hamstring Curl        Perform 25 times, 2x per day. (HEP to GO)
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
## 2020-03-09 ENCOUNTER — CLINICAL SUPPORT (OUTPATIENT)
Dept: REHABILITATION | Facility: HOSPITAL | Age: 25
End: 2020-03-09
Payer: MEDICAID

## 2020-03-09 DIAGNOSIS — R53.1 WEAKNESS: ICD-10-CM

## 2020-03-09 PROCEDURE — 97110 THERAPEUTIC EXERCISES: CPT | Mod: CQ

## 2020-03-09 PROCEDURE — 97140 MANUAL THERAPY 1/> REGIONS: CPT | Mod: CQ

## 2020-03-09 NOTE — PROGRESS NOTES
"  Physical Therapy Daily Treatment Note     Name: Michael Sotelo  Clinic Number: [id number]    Subjective     Pt reports: no pain in R hip but c/o LBP due to picking up boxes at work   He was compliant with home exercise program.  Response to previous treatment: no problem  Functional change: light duty at work     Pain: 0/10  Location: right hip    Objective     Michael received therapeutic exercises to develop strength, endurance, ROM, flexibility, posture and core stabilization for  minutes including:  Bike x 10 min for increased ROM, mm endurance and circulation   Bridges 3x10/3"  R SL bridges 3x5  Prone R hip ext 3x10  R SL hip abd 3x10    Michael received the following manual therapy techniques: Joint mobilizations and Soft tissue Mobilization were applied to the: R hip  for 10'  minutes, including: hip/quad stretching in prone,  HSS, IR/ER stretch     Home Exercises Provided and Patient Education Provided     Education provided:   HEP     Written Home Exercises Provided: yes.  Exercises were reviewed and Michael was able to demonstrate them prior to the end of the session.  Michael demonstrated good  understanding of the education provided.         Assessment   Pt tolerating tx well with no adverse pain in R hip. VC/TC for correcting form/technique. Continue with hip and core strengthening.   Michael is progressing well towards his goals.   Pt prognosis is Good.     Pt will continue to benefit from skilled outpatient physical therapy to address the deficits listed in the problem list box on initial evaluation, provide pt/family education and to maximize pt's level of independence in the home and community environment.     Pt's spiritual, cultural and educational needs considered and pt agreeable to plan of care and goals.    Anticipated barriers to physical therapy: none     Goals: Goals:  Short Term Goals: 2 weeks         1.   Independent with HEP (not met, progressing)        2.  Pt will report decreased pain level of < 50% from " above measure with ADL(not met, progressing)     Long Term Goals:   GOALS:    8_   weeks. Pt agrees with goals set.  1. Independent with HEP.(not met, progressing)  2. Report decreased    R hip    pain  <   / =  2/10 with ADL such as prolonged walking(not met, progressing)  3. Increased MMT  for  R LE to 4+/5 to 5/5  with ADL such as lifting(not met, progressing)  4. Increased arom  for  R hip to WNL with functional activities such walking or self-care(not met, progressing)      Plan   Continue with POC    Aguila Rogers, PTA, STS

## 2020-03-13 ENCOUNTER — CLINICAL SUPPORT (OUTPATIENT)
Dept: REHABILITATION | Facility: HOSPITAL | Age: 25
End: 2020-03-13
Payer: MEDICAID

## 2020-03-13 DIAGNOSIS — R53.1 WEAKNESS: ICD-10-CM

## 2020-03-13 PROCEDURE — 97110 THERAPEUTIC EXERCISES: CPT | Mod: CQ

## 2020-03-13 NOTE — PROGRESS NOTES
"  Physical Therapy Daily Treatment Note     Name: Michael Sotelo  Clinic Number: [id number]  Therapy Diagnosis:        Encounter Diagnoses   Name Primary?    Closed displaced combined transverse-posterior fracture of right acetabulum with routine healing, subsequent encounter      Weakness        Physician: Jerrod Muniz, NP     Physician Orders: PT Eval and Treat   Medical Diagnosis: S32.461D (ICD-10-CM) - Closed displaced combined transverse-posterior fracture of right acetabulum with routine healing, subsequent encounter   Evaluation Date: 3/3/2020  Authorization Period Expiration: 6-01-20  Plan of Care Certification Period: 5-30-20  Visit # / Visits authorized: 3/ 1     Time In: 1350  Time Out: 1450  Total Billable Time: 60 minutes  tx time 60     Precautions: Standard    Subjective     Pt reports: no pain in R hip   He was compliant with home exercise program.  Response to previous treatment: no problem  Functional change: light duty at work     Pain: 0/10  Location: right hip    Objective     Michael received therapeutic exercises to develop strength, endurance, ROM, flexibility, posture and core stabilization for  minutes including:  Bike x 10 min for increased ROM, mm endurance and circulation   Bridges 3x10/3"  R SL bridges 3x5  Prone R hip ext 3x10  Prone R hip ex knee flexed 90' 3x10  R SL hip abd 3x10  Rocker board mini squats 3x10  Rocker board plantar/dorsiflexion stretch 3x10    Michael received the following manual therapy techniques: Joint mobilizations and Soft tissue Mobilization were applied to the: R hip  for 10'  minutes, including: hip/quad stretching in prone,  HSS, IR/ER stretch     Home Exercises Provided and Patient Education Provided     Education provided:   HEP     Written Home Exercises Provided: yes.  Exercises were reviewed and Michael was able to demonstrate them prior to the end of the session.  Michael demonstrated good  understanding of the education provided.         Assessment   Pt " tolerating tx well with continued progress in hip and core strengthening.  VC/TC for correcting form/technique. Continue continue to progress as tolerated.   Michael is progressing well towards his goals.   Pt prognosis is Good.     Pt will continue to benefit from skilled outpatient physical therapy to address the deficits listed in the problem list box on initial evaluation, provide pt/family education and to maximize pt's level of independence in the home and community environment.     Pt's spiritual, cultural and educational needs considered and pt agreeable to plan of care and goals.    Anticipated barriers to physical therapy: none     Goals: Goals:  Short Term Goals: 2 weeks         1.   Independent with HEP (not met, progressing)        2.  Pt will report decreased pain level of < 50% from above measure with ADL(not met, progressing)     Long Term Goals:   GOALS:    8_   weeks. Pt agrees with goals set.  1. Independent with HEP.(not met, progressing)  2. Report decreased    R hip    pain  <   / =  2/10 with ADL such as prolonged walking(not met, progressing)  3. Increased MMT  for  R LE to 4+/5 to 5/5  with ADL such as lifting(not met, progressing)  4. Increased arom  for  R hip to WNL with functional activities such walking or self-care(not met, progressing)      Plan   Continue with POC    Aguila Rogers, PTA, STS

## 2020-03-16 ENCOUNTER — CLINICAL SUPPORT (OUTPATIENT)
Dept: REHABILITATION | Facility: HOSPITAL | Age: 25
End: 2020-03-16
Payer: MEDICAID

## 2020-03-16 DIAGNOSIS — R53.1 WEAKNESS: ICD-10-CM

## 2020-03-16 PROCEDURE — 97110 THERAPEUTIC EXERCISES: CPT | Performed by: PHYSICAL THERAPIST

## 2020-03-16 PROCEDURE — 97140 MANUAL THERAPY 1/> REGIONS: CPT | Performed by: PHYSICAL THERAPIST

## 2020-03-16 NOTE — PROGRESS NOTES
Physical Therapy Daily Treatment Note     Name: Michael Sotelo  Clinic Number: [id number]  Therapy Diagnosis:        Encounter Diagnoses   Name Primary?    Closed displaced combined transverse-posterior fracture of right acetabulum with routine healing, subsequent encounter      Weakness        Physician: Jerrod Muniz, NP     Physician Orders: PT Eval and Treat   Medical Diagnosis: S32.461D (ICD-10-CM) - Closed displaced combined transverse-posterior fracture of right acetabulum with routine healing, subsequent encounter   Evaluation Date: 3/3/2020  Authorization Period Expiration: 6-01-20  Plan of Care Certification Period: 5-30-20  Visit # / Visits authorized: 4/12     Time In: 1:00 pm  Time Out: 2:00 pm  Total Billable Time: 55 minutes     Precautions: Standard    Subjective     PROM is 105 deg flex, 35 ER, 25 IR and 40 abd with end range tightness.    Pt reports: R hip feels OK.  States he's been working without issues.  He was compliant with home exercise program.  Response to previous treatment: no problem  Functional change: light duty at work     Pain: 0/10  Location: right hip    Objective     Michael received therapeutic exercises to develop strength, endurance, ROM, flexibility, posture and core stabilization for  minutes including:  Bike x 10 min for increased ROM, mm endurance and circulation   Bridges   pilates Torres Martinez abd/add  Prone ball squeeze  Prone GTB hip abd and diagonals  Ball bridges  SLR SL hip abd  RF stretch in supine  HSS  Mini squats  SAQ  GTB abd in supine with knees flexed  Manual hip PROM and stretching x 10 min  HEP review    Home Exercises Provided and Patient Education Provided     Education provided:   HEP     Written Home Exercises Provided: yes.  Exercises were reviewed and Michael was able to demonstrate them prior to the end of the session.  Michael demonstrated good  understanding of the education provided.         Assessment   Pt tolerating tx well with continued progress in hip and  core strengthening.  VC/TC for correcting form/technique. Continue continue to progress as tolerated.   Michael is progressing well towards his goals.   Pt prognosis is Good.     Pt will continue to benefit from skilled outpatient physical therapy to address the deficits listed in the problem list box on initial evaluation, provide pt/family education and to maximize pt's level of independence in the home and community environment.     Pt's spiritual, cultural and educational needs considered and pt agreeable to plan of care and goals.    Anticipated barriers to physical therapy: none     Goals: Goals:  Short Term Goals: 2 weeks         1.   Independent with HEP (not met, progressing)        2.  Pt will report decreased pain level of < 50% from above measure with ADL(not met, progressing)     Long Term Goals:   GOALS:    8_   weeks. Pt agrees with goals set.  1. Independent with HEP.(not met, progressing)  2. Report decreased    R hip    pain  <   / =  2/10 with ADL such as prolonged walking(not met, progressing)  3. Increased MMT  for  R LE to 4+/5 to 5/5  with ADL such as lifting(not met, progressing)  4. Increased arom  for  R hip to WNL with functional activities such walking or self-care(not met, progressing)      Plan   Continue with POC    Demetrio Mcdaniel, PT, STS

## 2020-03-20 ENCOUNTER — TELEPHONE (OUTPATIENT)
Dept: REHABILITATION | Facility: HOSPITAL | Age: 25
End: 2020-03-20

## 2020-03-20 NOTE — TELEPHONE ENCOUNTER
Postponed Appointments  Patient: Michael Sotelo  Date: 3/20/2020  Diagnosis: No diagnosis found.  MRN: [id number]  Spoke with patient due to therapy following updates regarding COVID-19 closely and taking every precaution to ensure the safety of our patients, staff and community. In an abundance of caution and in an effort to help reduce risk and limit community spread, we have decided to temporarily postpone appointments for patients who may be at increased risk to attend in-person therapy or where therapy is not critically needed at this time. Plan of care and home exercise program were reviewed and patient has what they need to continue therapy at home. All patient questions were answered. Also stated to patient that we are exploring virtual methods of providing care and will be in touch over the next few weeks. Patient verbalized understanding to all.  Cal Mcdaniel, PT  3/20/2020

## 2020-04-07 ENCOUNTER — TELEPHONE (OUTPATIENT)
Dept: REHABILITATION | Facility: HOSPITAL | Age: 25
End: 2020-04-07

## 2020-04-07 NOTE — TELEPHONE ENCOUNTER
Called patient and discussed progress with Hip. Stated his hip is doing well and he has been compliant with HEP daily. Currently not working due to Covid 19 closure of businesses. Stated it has helped his hip recover. Encouraged patient to continue with HEP and to call us @ [facility] with any questions or concerns.   Aguila Rogers PTA, STS

## 2020-05-20 ENCOUNTER — HOSPITAL ENCOUNTER (OUTPATIENT)
Dept: RADIOLOGY | Facility: HOSPITAL | Age: 25
Discharge: HOME | End: 2020-05-20
Attending: ORTHOPAEDIC SURGERY
Payer: MEDICAID

## 2020-05-20 ENCOUNTER — OFFICE VISIT (OUTPATIENT)
Dept: ORTHOPEDICS | Facility: CLINIC | Age: 25
End: 2020-05-20
Payer: MEDICAID

## 2020-05-20 VITALS
HEIGHT: 69 IN | BODY MASS INDEX: 31.1 KG/M2 | SYSTOLIC BLOOD PRESSURE: 130 MMHG | HEART RATE: 97 BPM | RESPIRATION RATE: 18 BRPM | WEIGHT: 210 LBS | TEMPERATURE: 98 F | DIASTOLIC BLOOD PRESSURE: 82 MMHG

## 2020-05-20 DIAGNOSIS — S32.461D CLOSED DISPLACED COMBINED TRANSVERSE-POSTERIOR FRACTURE OF RIGHT ACETABULUM WITH ROUTINE HEALING, SUBSEQUENT ENCOUNTER: Primary | ICD-10-CM

## 2020-05-20 DIAGNOSIS — Z98.890 POST-OPERATIVE STATE: ICD-10-CM

## 2020-05-20 PROCEDURE — 99999 PR PBB SHADOW E&M-EST. PATIENT-LVL IV: CPT | Mod: PBBFAC,,, | Performed by: ORTHOPAEDIC SURGERY

## 2020-05-20 PROCEDURE — 99212 PR OFFICE/OUTPT VISIT, EST, LEVL II, 10-19 MIN: ICD-10-PCS | Mod: S$PBB,,, | Performed by: ORTHOPAEDIC SURGERY

## 2020-05-20 PROCEDURE — 72170 X-RAY EXAM OF PELVIS: CPT | Mod: 26,,, | Performed by: RADIOLOGY

## 2020-05-20 PROCEDURE — 99999 PR PBB SHADOW E&M-EST. PATIENT-LVL IV: ICD-10-PCS | Mod: PBBFAC,,, | Performed by: ORTHOPAEDIC SURGERY

## 2020-05-20 PROCEDURE — 99212 OFFICE O/P EST SF 10 MIN: CPT | Mod: S$PBB,,, | Performed by: ORTHOPAEDIC SURGERY

## 2020-05-20 PROCEDURE — 72170 X-RAY EXAM OF PELVIS: CPT | Mod: TC

## 2020-05-20 PROCEDURE — 99214 OFFICE O/P EST MOD 30 MIN: CPT | Mod: PBBFAC,25 | Performed by: ORTHOPAEDIC SURGERY

## 2020-05-20 PROCEDURE — 72170 XR PELVIS JUDET VIEWS: ICD-10-PCS | Mod: 26,,, | Performed by: RADIOLOGY
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
## 2020-05-22 ENCOUNTER — CLINICAL SUPPORT (OUTPATIENT)
Dept: REHABILITATION | Facility: HOSPITAL | Age: 25
End: 2020-05-22
Attending: ORTHOPAEDIC SURGERY
Payer: MEDICAID

## 2020-05-22 DIAGNOSIS — S32.461D CLOSED DISPLACED COMBINED TRANSVERSE-POSTERIOR FRACTURE OF RIGHT ACETABULUM WITH ROUTINE HEALING, SUBSEQUENT ENCOUNTER: ICD-10-CM

## 2020-05-22 DIAGNOSIS — R53.1 WEAKNESS: ICD-10-CM

## 2020-05-22 PROCEDURE — 97110 THERAPEUTIC EXERCISES: CPT

## 2020-05-22 NOTE — PROGRESS NOTES
"  Physical Therapy Daily Treatment Note     Name: Michael Sotelo  Clinic Number: [id number]  Therapy Diagnosis:        Encounter Diagnoses   Name Primary?    Closed displaced combined transverse-posterior fracture of right acetabulum with routine healing, subsequent encounter      Weakness        Physician: Jerrod Muniz, NP     Physician Orders: PT Eval and Treat   Medical Diagnosis: S32.461D (ICD-10-CM) - Closed displaced combined transverse-posterior fracture of right acetabulum with routine healing, subsequent encounter   Evaluation Date: 3/3/2020  Authorization Period Expiration: 6-01-20  Plan of Care Certification Period: 5-30-20  Visit # / Visits authorized: 1/1     Time In: 3:15 pm  Time Out: 4:00 pm  Total Billable Time: 45 minutes     Precautions: Standard    Subjective     See re-assessment    Pt reports: R hip feels OK.  States he's been working without issues.  He was compliant with home exercise program.  Response to previous treatment: no problem  Functional change: light duty at work     Pain: 0/10  Location: right hip    Objective     Michael received therapeutic exercises to develop strength, endurance, ROM, flexibility, posture and core stabilization for 45 minutes including:  Hip hinge 2x10  Squat retraining with squat rack 2x10  Squat x10  Lateral band walking green theraband x2 laps  SL stance paloff press 3x10 each 3#  90/90 elevated bridge 10x10"    Home Exercises Provided and Patient Education Provided     Education provided:   HEP     Written Home Exercises Provided: yes.  Exercises were reviewed and Michael was able to demonstrate them prior to the end of the session.  Michael demonstrated good  understanding of the education provided.         Assessment   Pt with significant improvement in squat mechanics following training. Pt with good overall strength, just needs to translate it to functional mobility. Will continue to progress with work-specific activities and lifting mechanics.   Michael is " progressing well towards his goals.   Pt prognosis is Good.     Pt will continue to benefit from skilled outpatient physical therapy to address the deficits listed in the problem list box on initial evaluation, provide pt/family education and to maximize pt's level of independence in the home and community environment.     Pt's spiritual, cultural and educational needs considered and pt agreeable to plan of care and goals.    Anticipated barriers to physical therapy: none     Goals: Goals:  Short Term Goals: 2 weeks         1.   Independent with HEP (not met, progressing)        2.  Pt will report decreased pain level of < 50% from above measure with ADL(not met, progressing)     Long Term Goals:   GOALS:    8_   weeks. Pt agrees with goals set.  1. Independent with HEP.(not met, progressing)  2. Report decreased    R hip    pain  <   / =  2/10 with ADL such as prolonged walking(not met, progressing)  3. Increased MMT  for  R LE to 4+/5 to 5/5  with ADL such as lifting(not met, progressing)  4. Increased arom  for  R hip to WNL with functional activities such walking or self-care(not met, progressing)      Plan   Continue with POC    Dayami Saxena, PT, STS

## 2020-05-22 NOTE — PLAN OF CARE
[facility]  Physical Therapy Re-Assessment    Name: Michael Palacios  Clinic Number: [id number]    Therapy Diagnosis:   Encounter Diagnoses   Name Primary?    Closed displaced combined transverse-posterior fracture of right acetabulum with routine healing, subsequent encounter     Weakness      Physician: Jerrod Muniz, NP    Physician Orders: PT Eval and Treat   Medical Diagnosis: S32.461D (ICD-10-CM) - Closed displaced combined transverse-posterior fracture of right acetabulum with routine healing, subsequent encounter   Evaluation Date: 3/3/2020  Authorization Period Expiration: 6-01-20  Plan of Care Certification Period: 7/17/20  Visit # / Visits authorized: 1/ 1    Time In: 3:15 pm  Time Out: 4:00 pm  Total Billable Time: 45 minutes    Precautions: Standard    Subjective     Date of surgery: 11-21-19 PROCEDURE:              Open treatment right hip dislocation  Open reduction internal fixation right acetabular fracture transverse and posterior wall    History of current condition - Michael reports: he has been working out at home since COVID pandemic, with trying to increase the lifting and moving. He has been working on leg lifts, lateral leg lifts, clamshells, and a few others. He got an entertainment stand and was able to help move that. He had to squat down for awhile when setting it up, which bothered. He would like to be able to return his strength more. 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 He saw the surgeon yesterday, who said he had mild weakness and a little extra bone growth not to worry about. He has not been having pain, just occasional soreness with missteps.        Past Medical History:   Diagnosis Date    Anxiety     Depression     History of psychiatric care     Psychiatric problem     Therapy      Michael Palacios  has a past surgical history that includes Appendectomy and Open reduction and internal fixation (ORIF) of fracture of acetabulum  (Right, 11/21/2019).    Michael has a current medication list which includes the following prescription(s): aspirin, docusate sodium, ondansetron, and oxycodone-acetaminophen.    Review of patient's allergies indicates:  No Known Allergies     Imaging: x-ray:  There is right acetabular reconstruction.  There is heterotopic ossification above the right hip joint.  No acute fracture dislocation bone destruction or complication seen.  No hardware failure seen.    Prior Therapy: 6 wks elsewhere  Social History:  lives with their family  Occupation:   Prior Level of Function: independent with ADL  Current Level of Function: mild limitation to ADL    Pain:  Current 0/10, worst 5/10, best 0/10   Location: right hip  Description: Aching  Aggravating Factors: Sitting and Walking  Easing Factors: rest    Pts goals: pain-free activity    Objective     Postural examination:  Pelvis level     Functional assessment:   - Squat: poor posterior weight shift, poor postural control, bilateral knee valgus          Range of motion:  PROM Right Left Comment   Hip Flexion: 120 degrees 120 degrees    Hip ABD: 35 degrees 35 degrees    Hip ER, 90°  flex: 30 degrees 45 degrees    Hip IR, 90°  flex: 10 degrees 10 degrees    Knee Extension: 5 degrees 5 degrees    Knee Flexion: 120 degrees 120 degrees    *pain       Strength:   Right Left Comment   Hip Flexion: 5/5 5/5    Hip ABD: 4/5 4+/5    Hip ADD: 5/5 5/5    Hip Extension: 4/5 4+/5    Knee Ext: 5/5 5/5    Knee Flex: 5/5 5/5        Tone:  Decreased quads; GOOD QS ability; no lag with SLR    Flexibility testing:   Tight hams    Special tests:   na    Palpation:   No TTP    Joint mobility: fair    Swelling:  na    Other:  Incision well healed           TREATMENT     See daily note    Assessment     Michael is a 25 y.o. male referred to outpatient Physical Therapy with a medical diagnosis of S32.461D (ICD-10-CM) - Closed displaced combined transverse-posterior fracture of right acetabulum with  routine healing, subsequent encounter   .  Pt presents with R hip pain, weakness and decreased ROM.    Pt prognosis is Good.   Pt will benefit from skilled outpatient Physical Therapy to address the deficits stated above and in the chart below, provide pt/family education, and to maximize pt's level of independence.     Plan of care discussed with patient: Yes  Pt's spiritual, cultural and educational needs considered and patient is agreeable to the plan of care and goals as stated below:     Anticipated Barriers for therapy: none    Medical Necessity is demonstrated by the following:  History  Co-morbidities and personal factors that may impact the plan of care Co-morbidities:   anxiety    Personal Factors:   no deficits     low   Examination  Body Structures and Functions, activity limitations and participation restrictions that may impact the plan of care Body Regions:   lower extremities    Body Systems:    ROM  strength  balance  gait    Participation Restrictions:   none    Activity limitations:   Learning and applying knowledge  no deficits    General Tasks and Commands  no deficits    Communication  no deficits    Mobility  lifting and carrying objects  walking    Self care  no deficits    Domestic Life  doing house work (cleaning house, washing dishes, laundry)    Interactions/Relationships  no deficits    Life Areas  no deficits    Community and Social Life  no deficits         low   Clinical Presentation stable and uncomplicated low   Decision Making/ Complexity Score: low     Goals:  Short Term Goals: 4 weeks         1.   Independent with HEP        2.  Pt will demonstrate 10 degree improvement in hip ER ROM  3. Pt will demonstrate SL balance >30 sec for improved stability    Long Term Goals:   GOALS:    8_   weeks. Pt agrees with goals set.  1. Independent with HEP.  2. Be able to walk 2 miles without pain for return to work demands  3. Increased MMT  for  R LE to 5/5  with ADL such as lifting  4. Pt  will be able to lift 30# from floor to waist with proper mechanics for work-specific duties      Plan     Certification Period/Plan of care expiration: 5/22/20 to 7/17/2020.    Outpatient Physical Therapy 2 times weekly for 8 weeks to include the following interventions: Manual Therapy, Moist Heat/ Ice, Patient Education and Therapeutic Exercise.     Dayami Saxena, PT

## 2020-05-27 ENCOUNTER — CLINICAL SUPPORT (OUTPATIENT)
Dept: REHABILITATION | Facility: HOSPITAL | Age: 25
End: 2020-05-27
Attending: ORTHOPAEDIC SURGERY
Payer: MEDICAID

## 2020-05-27 DIAGNOSIS — R53.1 WEAKNESS: ICD-10-CM

## 2020-05-27 PROCEDURE — 97110 THERAPEUTIC EXERCISES: CPT | Mod: CQ

## 2020-05-27 NOTE — PROGRESS NOTES
"  Physical Therapy Daily Treatment Note     Name: Michael Sotelo  Clinic Number: [id number]    Therapy Diagnosis:   Encounter Diagnosis   Name Primary?    Weakness      Physician: Morro Michelle    Visit Date: 5/27/2020    Physician Orders: PT Eval and Treat   Medical Diagnosis: S32.461D (ICD-10-CM) - Closed displaced combined transverse-posterior fracture of right acetabulum with routine healing, subsequent encounter   Evaluation Date: 3/3/2020  Authorization Period Expiration: 6-01-20  Plan of Care Certification Period: 7/17/20  Visit # / Visits authorized: 1/1 pending    Time In: 1650  Time Out: 1730  Total Billable Time: 40 minutes    Precautions: Standard  Procedures: 11/19/19 open treatment R hip dislocation, open reduction internal fixation R acetabular fx transverse and posterior wall    Subjective     Pt reports: Pt returns to therapy this pm with no c/o R hip pain upon entry. Reports compliance with HEP.    He was compliant with home exercise program.  Response to previous treatment: No adverse effects  Functional change: N/A    Pain: 0/10  Location: right hip      Objective     Michael received therapeutic exercises to develop strength, endurance, ROM and flexibility for 25 minutes including:    Upright bike x 8'  HK/BK x 1 lap each  Lateral walks c green tb x 1 lap  Shuttle DLSQ (3.0 bands) 3 x 15  Shuttle SLSQ (1.5 bands) x 10,10,10 sona     Michael received the following manual therapy techniques: Joint mobilizations were applied for 05 minutes including:    Assessment of hip ROM    Michael participated in neuromuscular re-education activities to improve Balance, Coordination, Proprioception and Neuromuscular Control for 10 minutes. The following activities were included:    SLS ball toss on soft foam 2 x 30" sona  SL bridge into sit up x 10 sona     Michael participated in dynamic functional therapeutic activities to improve functional performance for 00 minutes including:        *Billed as therapeutic exercise " per Medicaid guidelines*    Home Exercises Provided and Patient Education Provided      Education provided:   HEP      Written Home Exercises Provided: yes.  Exercises were reviewed and Michael was able to demonstrate them prior to the end of the session.  Michael demonstrated good understanding of the education provided.     Assessment     Pt was able to complete all exercises including progressions with c/o min R hip pain during L SL bridge. Noted fatigue throughout tx d/t deconditioning and frequent LOB during SLS. Encouraged continued compliance with HEP; pt voiced understanding.     Michael is progressing well towards his goals.   Pt prognosis is Good.     Pt will continue to benefit from skilled outpatient physical therapy to address the deficits listed in the problem list box on initial evaluation, provide pt/family education and to maximize pt's level of independence in the home and community environment.     Pt's spiritual, cultural and educational needs considered and pt agreeable to plan of care and goals.     Anticipated barriers to physical therapy: none    Goals:  Short Term Goals: 4 weeks         1.   Independent with HEP        2.  Pt will demonstrate 10 degree improvement in hip ER ROM  3. Pt will demonstrate SL balance >30 sec for improved stability     Long Term Goals:   GOALS:    8_   weeks. Pt agrees with goals set.  1. Independent with HEP.  2. Be able to walk 2 miles without pain for return to work demands  3. Increased MMT  for  R LE to 5/5  with ADL such as lifting  4. Pt will be able to lift 30# from floor to waist with proper mechanics for work-specific duties      Plan     Certification Period/Plan of care expiration: 5/22/20 to 7/17/2020.     Outpatient Physical Therapy 2 times weekly for 8 weeks to include the following interventions: Manual Therapy, Moist Heat/ Ice, Patient Education and Therapeutic Exercise.     Continue to progress as tolerated according to PT POC and MD order.     Deanna  Young, PTA

## 2020-06-01 ENCOUNTER — CLINICAL SUPPORT (OUTPATIENT)
Dept: REHABILITATION | Facility: HOSPITAL | Age: 25
End: 2020-06-01
Payer: MEDICAID

## 2020-06-01 DIAGNOSIS — R53.1 WEAKNESS: ICD-10-CM

## 2020-06-01 PROCEDURE — 97110 THERAPEUTIC EXERCISES: CPT

## 2020-06-01 NOTE — PROGRESS NOTES
"  Physical Therapy Daily Treatment Note     Name: Michael Sotelo  Clinic Number: [id number]    Therapy Diagnosis:   Encounter Diagnosis   Name Primary?    Weakness      Physician: Morro Michelle    Visit Date: 6/1/2020    Physician Orders: PT Eval and Treat   Medical Diagnosis: S32.461D (ICD-10-CM) - Closed displaced combined transverse-posterior fracture of right acetabulum with routine healing, subsequent encounter   Evaluation Date: 3/3/2020  Authorization Period Expiration: 6-01-20  Plan of Care Certification Period: 7/17/20  Visit # / Visits authorized: 1/1 pending    Time In: 1220  Time Out: 1300  Total Billable Time: 40 minutes    Precautions: Standard  Procedures: 11/19/19 open treatment R hip dislocation, open reduction internal fixation R acetabular fx transverse and posterior wall    Subjective     Pt reports: He is feeling good with no complaints.     He was compliant with home exercise program.  Response to previous treatment: No adverse effects  Functional change: N/A    Pain: 0/10  Location: right hip      Objective     VITALS:  Time BP Comments   12:55 PM  124 / 86        Michael received therapeutic exercises to develop strength, endurance, ROM and flexibility for 15 minutes including:    Upright bike x 6'  Hand heel rocking 20x5"  HK/BK x 1 lap each- NP  Lateral, forward, backward walks c green tb x 1 lap  Shuttle DLSQ (3.0 bands) 3 x 15- NP  Shuttle SLSQ (1.5 bands) x 10,10,10 sona -NP    Michael received the following manual therapy techniques: Joint mobilizations were applied for 10 minutes including:    Assessment of hip ROM  Inferior glide, grade III    Michael participated in neuromuscular re-education activities to improve Balance, Coordination, Proprioception and Neuromuscular Control for 15 minutes. The following activities were included:  Squat retraining x20  Kettlebell deadlift 3x10 15#  Unilateral suitcase carry x2 laps each 25#- next session  SLS ball toss on soft foam 2 x 30" sona- NP  SL " bridge into sit up x 10 sona - NP    Michael participated in dynamic functional therapeutic activities to improve functional performance for 00 minutes including:    *Billed as therapeutic exercise per Medicaid guidelines*    Home Exercises Provided and Patient Education Provided      Education provided:   HEP      Written Home Exercises Provided: yes.  Exercises were reviewed and Michael was able to demonstrate them prior to the end of the session.  Michael demonstrated good understanding of the education provided.     Assessment     Pt demonstrated improved hip control with squat and functional activities. Reported difficulty breathing following monster walks, so laid patient on back with feet elevated and ankle pumps performed, and took BP. Symptoms resolved with time, and pt was able to incrementally resume standing position without difficulty.      Michael is progressing well towards his goals.   Pt prognosis is Good.     Pt will continue to benefit from skilled outpatient physical therapy to address the deficits listed in the problem list box on initial evaluation, provide pt/family education and to maximize pt's level of independence in the home and community environment.     Pt's spiritual, cultural and educational needs considered and pt agreeable to plan of care and goals.     Anticipated barriers to physical therapy: none    Goals:  Short Term Goals: 4 weeks         1.   Independent with HEP        2.  Pt will demonstrate 10 degree improvement in hip ER ROM  3. Pt will demonstrate SL balance >30 sec for improved stability     Long Term Goals:   GOALS:    8_   weeks. Pt agrees with goals set.  1. Independent with HEP.  2. Be able to walk 2 miles without pain for return to work demands  3. Increased MMT  for  R LE to 5/5  with ADL such as lifting  4. Pt will be able to lift 30# from floor to waist with proper mechanics for work-specific duties      Plan     Certification Period/Plan of care expiration: 5/22/20 to  7/17/2020.     Outpatient Physical Therapy 2 times weekly for 8 weeks to include the following interventions: Manual Therapy, Moist Heat/ Ice, Patient Education and Therapeutic Exercise.     Continue to progress as tolerated according to PT POC and MD order.     Dayami Saxena, PT

## 2020-06-05 ENCOUNTER — CLINICAL SUPPORT (OUTPATIENT)
Dept: REHABILITATION | Facility: HOSPITAL | Age: 25
End: 2020-06-05
Payer: MEDICAID

## 2020-06-05 DIAGNOSIS — R53.1 WEAKNESS: ICD-10-CM

## 2020-06-05 PROCEDURE — 97110 THERAPEUTIC EXERCISES: CPT | Mod: CQ

## 2020-06-05 NOTE — PROGRESS NOTES
"  Physical Therapy Daily Treatment Note     Name: Michael Sotelo  Clinic Number: [id number]    Therapy Diagnosis:   Encounter Diagnosis   Name Primary?    Weakness      Physician: Morro Michelle    Visit Date: 6/5/2020    Physician Orders: PT Eval and Treat   Medical Diagnosis: S32.461D (ICD-10-CM) - Closed displaced combined transverse-posterior fracture of right acetabulum with routine healing, subsequent encounter   Evaluation Date: 3/3/2020  Authorization Period Expiration: 6-01-20  Plan of Care Certification Period: 7/17/20  Visit # / Visits authorized: 1/1 pending    Time In: 1000  Time Out: 1045  Total Billable Time: 45 minutes    Precautions: Standard  Procedures: 11/19/19 open treatment R hip dislocation, open reduction internal fixation R acetabular fx transverse and posterior wall    Subjective     Pt reports: He is feeling good with no complaints.     He was compliant with home exercise program.  Response to previous treatment: No adverse effects  Functional change: N/A    Pain: 0/10  Location: right hip      Objective     VITALS:  Time BP Comments   12:55 PM  124 / 86        Michael received therapeutic exercises to develop strength, endurance, ROM and flexibility for 20 minutes including:    Upright bike x 6'  HK/BK x 1 lap each  Lateral walks c green tb x 1 lap  Lateral monster walks c green tb x 1 lap    NP:  Hand heel rocking 20x5"  Shuttle DLSQ (3.0 bands) 3 x 15- NP  Shuttle SLSQ (1.5 bands) x 10,10,10 sona -NP    Michael received the following manual therapy techniques: Joint mobilizations were applied for 05 minutes including:    Assessment of hip ROM  Inferior glide, grade III    Michael participated in neuromuscular re-education activities to improve Balance, Coordination, Proprioception and Neuromuscular Control for 00 minutes. The following activities were included:    NP:  Unilateral suitcase carry x2 laps each 25#- next session  SL bridge into sit up x 10 sona - NP  Squat retraining " x20  Kettlebell deadlift 3x10 15#    Michael participated in dynamic functional therapeutic activities to improve functional performance for 23 minutes includinin lateral step downs 2 x 10 sona   Sliding board retro lunges 2 x 10 sona     *Billed as therapeutic exercise per Medicaid guidelines*    Home Exercises Provided and Patient Education Provided      Education provided:   HEP      Written Home Exercises Provided: yes.  Exercises were reviewed and Michael was able to demonstrate them prior to the end of the session.  Michael demonstrated good understanding of the education provided.     Assessment     Pt was able to complete all exercises including progressions with c/o mod sharp pain in L groin during retro lunges. Noted fatigue throughout tx, especially during sliding board lunges and lateral walks, d/t deconditioning and weak hip musculature. No reports of dizziness today. Encouraged continued compliance with HEP; pt voiced understanding.     Michael is progressing well towards his goals.   Pt prognosis is Good.     Pt will continue to benefit from skilled outpatient physical therapy to address the deficits listed in the problem list box on initial evaluation, provide pt/family education and to maximize pt's level of independence in the home and community environment.     Pt's spiritual, cultural and educational needs considered and pt agreeable to plan of care and goals.     Anticipated barriers to physical therapy: none    Goals:  Short Term Goals: 4 weeks         1.   Independent with HEP        2.  Pt will demonstrate 10 degree improvement in hip ER ROM  3. Pt will demonstrate SL balance >30 sec for improved stability     Long Term Goals:   GOALS:    8_   weeks. Pt agrees with goals set.  1. Independent with HEP.  2. Be able to walk 2 miles without pain for return to work demands  3. Increased MMT  for  R LE to 5/5  with ADL such as lifting  4. Pt will be able to lift 30# from floor to waist with proper mechanics  for work-specific duties      Plan   Certification Period/Plan of care expiration: 5/22/20 to 7/17/2020.     Outpatient Physical Therapy 2 times weekly for 8 weeks to include the following interventions: Manual Therapy, Moist Heat/ Ice, Patient Education and Therapeutic Exercise.     Continue to progress as tolerated according to PT POC and MD order.     Deanna Vidal, PTA

## 2020-06-08 ENCOUNTER — CLINICAL SUPPORT (OUTPATIENT)
Dept: REHABILITATION | Facility: HOSPITAL | Age: 25
End: 2020-06-08
Payer: MEDICAID

## 2020-06-08 PROCEDURE — 97530 THERAPEUTIC ACTIVITIES: CPT | Mod: CQ

## 2020-06-08 PROCEDURE — 97110 THERAPEUTIC EXERCISES: CPT | Mod: CQ

## 2020-06-08 NOTE — PROGRESS NOTES
Physical Therapy Daily Treatment Note     Name: Michael Sotelo  Clinic Number: [id number]    Therapy Diagnosis:   No diagnosis found.  Physician: Morro Michelle    Visit Date: 2020    Physician Orders: PT Eval and Treat   Medical Diagnosis: S32.461D (ICD-10-CM) - Closed displaced combined transverse-posterior fracture of right acetabulum with routine healing, subsequent encounter   Evaluation Date: 3/3/2020  Authorization Period Expiration: 20  Plan of Care Certification Period: 20  Visit # / Visits authorized:  pending    Time In: 1225  Time Out: 1310  Total Billable Time: 45 minutes    Precautions: Standard  Procedures: 19 open treatment R hip dislocation, open reduction internal fixation R acetabular fx transverse and posterior wall    Subjective     Pt reports: He is feeling good with no complaints.     He was compliant with home exercise program.  Response to previous treatment: No adverse effects  Functional change: N/A    Pain: 0/10  Location: right hip      Objective       Michael received therapeutic exercises to develop strength, endurance, ROM and flexibility for 25 minutes including:    Upright bike x 10 for increased ROM, mm endurance and strength   B SL BTB calm shells 3x10  Lateral walks c green tb x 1 lap  Lateral monster walks c green tb x 1 lap  Shuttle press 75# 3x10    Michael received the following manual therapy techniques: Joint mobilizations were applied for 0 minutes including:        Michael participated in neuromuscular re-education activities to improve Balance, Coordination, Proprioception and Neuromuscular Control with work related activities for  0 minutes. The following activities were included:    Michael participated in dynamic functional therapeutic activities to improve functional performance for 20  minutes includin# sand bag squat and lift waist level to table 2x10  35# sand bag squat and lift overhead to shelves 3x5   35# sand bag carry waist level turf 2  laps  35# sand bag carry over shoulder turf 2 laps     *Billed as therapeutic exercise per Medicaid guidelines*    Home Exercises Provided and Patient Education Provided      Education provided:   HEP      Written Home Exercises Provided: yes.  Exercises were reviewed and Michael was able to demonstrate them prior to the end of the session.  Michael demonstrated good understanding of the education provided.     Assessment     Pt tolerating tx well but with min discomfort in L groin with functional activity, but able to tolerated. No pain at rest.  Noted fatigue during tx. No reports of dizziness today. Continue with functional work related activity in [place] per Supervising PT.   Michael is progressing well towards his goals.   Pt prognosis is Good.     Pt will continue to benefit from skilled outpatient physical therapy to address the deficits listed in the problem list box on initial evaluation, provide pt/family education and to maximize pt's level of independence in the home and community environment.     Pt's spiritual, cultural and educational needs considered and pt agreeable to plan of care and goals.     Anticipated barriers to physical therapy: none    Goals:  Short Term Goals: 4 weeks         1.   Independent with HEP        2.  Pt will demonstrate 10 degree improvement in hip ER ROM  3. Pt will demonstrate SL balance >30 sec for improved stability     Long Term Goals:   GOALS:    8_   weeks. Pt agrees with goals set.  1. Independent with HEP.  2. Be able to walk 2 miles without pain for return to work demands  3. Increased MMT  for  R LE to 5/5  with ADL such as lifting  4. Pt will be able to lift 30# from floor to waist with proper mechanics for work-specific duties      Plan   Certification Period/Plan of care expiration: 5/22/20 to 7/17/2020.     Outpatient Physical Therapy 2 times weekly for 8 weeks to include the following interventions: Manual Therapy, Moist Heat/ Ice, Patient Education and Therapeutic  Exercise.     Continue to progress as tolerated according to PT POC and MD order.     Aguila Rogers, PTA, STS
